# Patient Record
Sex: FEMALE | Race: WHITE
[De-identification: names, ages, dates, MRNs, and addresses within clinical notes are randomized per-mention and may not be internally consistent; named-entity substitution may affect disease eponyms.]

---

## 2017-05-13 ENCOUNTER — HOSPITAL ENCOUNTER (EMERGENCY)
Dept: HOSPITAL 63 - ER | Age: 22
Discharge: HOME | End: 2017-05-13
Payer: SELF-PAY

## 2017-05-13 VITALS — SYSTOLIC BLOOD PRESSURE: 100 MMHG | DIASTOLIC BLOOD PRESSURE: 68 MMHG

## 2017-05-13 VITALS — WEIGHT: 143 LBS | HEIGHT: 67 IN | BODY MASS INDEX: 22.44 KG/M2

## 2017-05-13 DIAGNOSIS — Z88.1: ICD-10-CM

## 2017-05-13 DIAGNOSIS — Z88.6: ICD-10-CM

## 2017-05-13 DIAGNOSIS — N39.0: ICD-10-CM

## 2017-05-13 DIAGNOSIS — O21.0: Primary | ICD-10-CM

## 2017-05-13 DIAGNOSIS — F15.10: ICD-10-CM

## 2017-05-13 DIAGNOSIS — Z3A.01: ICD-10-CM

## 2017-05-13 DIAGNOSIS — Z87.442: ICD-10-CM

## 2017-05-13 DIAGNOSIS — R51: ICD-10-CM

## 2017-05-13 DIAGNOSIS — F14.10: ICD-10-CM

## 2017-05-13 LAB
ALBUMIN SERPL-MCNC: 3.8 G/DL (ref 3.4–5)
ALP SERPL-CCNC: 48 U/L (ref 46–116)
ALT SERPL-CCNC: 21 U/L (ref 14–59)
AMPHETAMINE/METHAMPHETAMINE: (no result)
ANION GAP SERPL CALC-SCNC: 8 MMOL/L (ref 6–14)
APTT PPP: YELLOW S
AST SERPL-CCNC: 13 U/L (ref 15–37)
BACTERIA #/AREA URNS HPF: (no result) /HPF
BARBITURATES UR-MCNC: (no result) UG/ML
BASOPHILS # BLD AUTO: 0 X10^3/UL (ref 0–0.2)
BASOPHILS NFR BLD: 0 % (ref 0–3)
BENZODIAZ UR-MCNC: (no result) UG/L
BILIRUB DIRECT SERPL-MCNC: 0.1 MG/DL (ref 0–0.2)
BILIRUB SERPL-MCNC: 0.3 MG/DL (ref 0.2–1)
BILIRUB UR QL STRIP: (no result)
CA-I SERPL ISE-MCNC: 6 MG/DL (ref 7–20)
CALCIUM SERPL-MCNC: 8.8 MG/DL (ref 8.5–10.1)
CANNABINOIDS UR-MCNC: (no result) UG/L
CHLORIDE SERPL-SCNC: 105 MMOL/L (ref 98–107)
CO2 SERPL-SCNC: 27 MMOL/L (ref 21–32)
COCAINE UR-MCNC: (no result) NG/ML
CREAT SERPL-MCNC: 0.7 MG/DL (ref 0.6–1)
EOSINOPHIL NFR BLD: 0 X10^3/UL (ref 0–0.7)
EOSINOPHIL NFR BLD: 1 % (ref 0–3)
ERYTHROCYTE [DISTWIDTH] IN BLOOD BY AUTOMATED COUNT: 14.2 % (ref 11.5–14.5)
FIBRINOGEN PPP-MCNC: (no result) MG/DL
GFR SERPLBLD BASED ON 1.73 SQ M-ARVRAT: 105.6 ML/MIN
GLUCOSE SERPL-MCNC: 91 MG/DL (ref 70–99)
GLUCOSE UR STRIP-MCNC: (no result) MG/DL
HCT VFR BLD CALC: 39.6 % (ref 36–47)
HGB BLD-MCNC: 13.4 G/DL (ref 12–15.5)
LYMPHOCYTES # BLD: 2.2 X10^3/UL (ref 1–4.8)
LYMPHOCYTES NFR BLD AUTO: 23 % (ref 24–48)
MAGNESIUM SERPL-MCNC: 1.9 MG/DL (ref 1.8–2.4)
MCH RBC QN AUTO: 31 PG (ref 25–35)
MCHC RBC AUTO-ENTMCNC: 34 G/DL (ref 31–37)
MCV RBC AUTO: 93 FL (ref 79–100)
METHADONE SERPL-MCNC: (no result) NG/ML
MONO #: 0.5 X10^3/UL (ref 0–1.1)
MONOCYTES NFR BLD: 5 % (ref 0–9)
NEUT #: 6.7 X10^3UL (ref 1.8–7.7)
NEUTROPHILS NFR BLD AUTO: 71 % (ref 31–73)
NITRITE UR QL STRIP: (no result)
OPIATES UR-MCNC: (no result) NG/ML
PCP SERPL-MCNC: (no result) MG/DL
PLATELET # BLD AUTO: 202 X10^3/UL (ref 140–400)
POTASSIUM SERPL-SCNC: 4 MMOL/L (ref 3.5–5.1)
PROT SERPL-MCNC: 7.4 G/DL (ref 6.4–8.2)
RBC # BLD AUTO: 4.27 X10^6/UL (ref 3.5–5.4)
RBC #/AREA URNS HPF: (no result) /HPF (ref 0–2)
SODIUM SERPL-SCNC: 140 MMOL/L (ref 136–145)
SP GR UR STRIP: 1.01
SQUAMOUS #/AREA URNS LPF: (no result) /LPF
UROBILINOGEN UR-MCNC: 0.2 MG/DL
WBC # BLD AUTO: 9.5 X10^3/UL (ref 4–11)
WBC #/AREA URNS HPF: (no result) /HPF (ref 0–4)

## 2017-05-13 PROCEDURE — 80076 HEPATIC FUNCTION PANEL: CPT

## 2017-05-13 PROCEDURE — 80305 DRUG TEST PRSMV DIR OPT OBS: CPT

## 2017-05-13 PROCEDURE — 36415 COLL VENOUS BLD VENIPUNCTURE: CPT

## 2017-05-13 PROCEDURE — 83735 ASSAY OF MAGNESIUM: CPT

## 2017-05-13 PROCEDURE — 96374 THER/PROPH/DIAG INJ IV PUSH: CPT

## 2017-05-13 PROCEDURE — 81001 URINALYSIS AUTO W/SCOPE: CPT

## 2017-05-13 PROCEDURE — 80048 BASIC METABOLIC PNL TOTAL CA: CPT

## 2017-05-13 PROCEDURE — 96375 TX/PRO/DX INJ NEW DRUG ADDON: CPT

## 2017-05-13 PROCEDURE — 96361 HYDRATE IV INFUSION ADD-ON: CPT

## 2017-05-13 PROCEDURE — 85027 COMPLETE CBC AUTOMATED: CPT

## 2017-05-13 PROCEDURE — S0028 INJECTION, FAMOTIDINE, 20 MG: HCPCS

## 2017-05-13 PROCEDURE — 80320 DRUG SCREEN QUANTALCOHOLS: CPT

## 2017-05-13 PROCEDURE — 99284 EMERGENCY DEPT VISIT MOD MDM: CPT

## 2017-05-13 PROCEDURE — 84702 CHORIONIC GONADOTROPIN TEST: CPT

## 2017-05-13 NOTE — PHYS DOC
General


Chief Complaint:  VOMITING IN PREGNANCY


Stated Complaint:  NAUSEA,VOMITING,CRAMPING 7 1/2 WKS PREGNANT


Time Seen by MD:  07:22


Source:  patient


Exam Limitations:  no limitations


Problems:  





History of Present Illness


Initial Comments


Pt is 7.5 wks gestation  21/F to ED with her mom for n/v, cramping in 

pregnancy.


Pt states she was seen two weeks ago at [facility] for similar sx, on that day pt 

dx pregnant and US confirmed viable intrauterine pregnancy estimated at the 

time 5.5wks.  Pt has not seen OB states she is waiting for her insurance card.  

States since she was seen at [facility] two weeks ago she's had nausea with 

vomitting daily, past few days decrease urination/muscle cramps/dizzy when up 

and active.  No vaginal discharge/bleeding, no focal abdominal pain complaints.

  Pt states she is monogamous in relationship, she admits to h/o trichomonas 

infection.  Pt also tests regularly as she was in a prior relationship with HIV

+ partner, pt has tested negative to date.  Pt states she has 2-3 year history 

of cocaine and methamphetamine abuse "in the past" but states she is clean of 

illicit drugs.


Timing/Duration:  constant


Severity:  moderate


Modifying Factors:  worse with eating, improves with medication, worse with 

movement, improves with rest


Associated Symptoms:  headaches, malaise, nausea/vomiting, weakness, other


Allergies:  


Coded Allergies:  


     hydrocodone (Verified  Allergy, Unknown, 17)


     amoxicillin (Verified  Adverse Reaction, Intermediate, 14)


     cefaclor (Verified  Adverse Reaction, Intermediate, 14)


 per ED RN-patient states she tolerated rocephin IM shots in


 the past (recently)





Past Medical History


Medical History:  other (kidney stones, UTI, trichomonas, HIV exposure)


Surgical History:  noncontributory





Family History


Significant Family History:  no pertinent family hx





Social History


Drugs:  other (cocaine and methamphetamine history denies current)





Review of Systems


Constitutional:  denies chills, denies fever, malaise


Respiratory:  denies cough, denies shortness of breath


Cardiovascular:  denies chest pain, denies palpitations


Gastrointestinal:  see HPI


Genitourinary:  denies dysuria, denies frequency, denies hematuria


Musculoskeletal:  denies back pain, denies joint swelling, denies neck pain


Psychiatric/Neurological:  headache, denies numbness, denies paresthesia, 

denies weakness


Hematologic/Lymphatic:  denies blood clots, denies easy bleeding, denies easy 

bruising





Physical Exam


General Appearance:  WD/WN, mild distress


Eyes:  bilateral eye normal inspection, bilateral eye PERRL, bilateral eye EOMI


Ear, Nose, Throat:  hearing grossly normal, normal ENT inspection, normal 

pharynx


Neck:  non-tender, supple


Respiratory:  normal breath sounds, no respiratory distress


Cardiovascular:  normal peripheral pulses, regular rate, rhythm, no edema


Gastrointestinal:  soft (ND, BS normal, generalized TTP no r/g/mass), no 

organomegaly


Rectal:  deferred


Back:  no CVA tenderness, no vertebral tenderness


Extremities:  non-tender, normal inspection


Neurologic/Psychiatric:  CNs II-XII nml as tested, no motor/sensory deficits, 

alert, normal mood/affect, oriented x 3


Skin:  normal color, warm/dry





Orders, Labs, Meds


Reassuring ED workup.





Pt feeling much better after zofran/IV fluids.  She expressed agreement/

understanding with treatment plan.


Departure


Time of Disposition:  09:08


Disposition:  01 HOME, SELF-CARE


Diagnosis:  hyperemesis gravidarium


Condition:  IMPROVED


Patient Instructions:  Diet - Hyperemesis Gravidarum, Hyperemesis Gravidarum, 

Medicines During Pregnancy





Additional Instructions:  


Please review the patient education materials.


OTC ranitidine for reflux symptoms.


Aggressive hydration with gatorade, water.


Stop smoking, seek medical assistance if necessary.


Take prenatal vitamins daily.


Rx:  zofrand odt


Schedule your initial OB evaluation ASAP.


Follow up with a doctor next week.


Return to ED with new or changing symptoms.











KEESHA VALDES DO May 13, 2017 07:51

## 2017-05-20 ENCOUNTER — HOSPITAL ENCOUNTER (EMERGENCY)
Dept: HOSPITAL 63 - ER | Age: 22
Discharge: HOME | End: 2017-05-20
Payer: MEDICAID

## 2017-05-20 VITALS — HEIGHT: 67 IN | WEIGHT: 143 LBS | BODY MASS INDEX: 22.44 KG/M2

## 2017-05-20 VITALS — DIASTOLIC BLOOD PRESSURE: 50 MMHG | SYSTOLIC BLOOD PRESSURE: 125 MMHG

## 2017-05-20 DIAGNOSIS — F15.10: ICD-10-CM

## 2017-05-20 DIAGNOSIS — S20.222A: ICD-10-CM

## 2017-05-20 DIAGNOSIS — F12.10: ICD-10-CM

## 2017-05-20 DIAGNOSIS — S30.0XXA: ICD-10-CM
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
## 2017-05-20 DIAGNOSIS — F14.10: ICD-10-CM

## 2017-05-20 DIAGNOSIS — O9A.211: Primary | ICD-10-CM

## 2017-05-20 DIAGNOSIS — Z88.1: ICD-10-CM
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
## 2017-05-20 DIAGNOSIS — Z87.440: ICD-10-CM

## 2017-05-20 DIAGNOSIS — Z87.442: ICD-10-CM

## 2017-05-20 DIAGNOSIS — Z3A.09: ICD-10-CM

## 2017-05-20 DIAGNOSIS — Z88.6: ICD-10-CM

## 2017-05-20 DIAGNOSIS — S20.221A: ICD-10-CM
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
## 2017-05-20 PROCEDURE — 99282 EMERGENCY DEPT VISIT SF MDM: CPT

## 2017-05-20 NOTE — PHYS DOC
Past History


Past Medical History:  Kidney Stones, Ovarian Cyst, UTI, Other


Past Surgical History:  Other


Smoking:  Non-smoker


Alcohol Use:  Rarely


Drug Use:  Cocaine, Marijuana, Methamphetamine





Adult General


Chief Complaint


Chief Complaint:  MECHANICAL FALL





HPI


HPI





Patient is a 21-year-old female who fell down a flight of stairs on her back, 

she was carrying something and the stairs worse deep, she just missed a step. 

She denies being injured by anyone, denies being pushed. She states she fell 

down about 15-20 stairs. She is especially worried because she is 9 weeks 

pregnant. She has not had any prenatal care, she is "waiting for her insurance"

. She's had no vaginal bleeding.





Review of Systems


Review of Systems








: States that she is pregnant


Musculoskeletal: Complains of back pain, denies other musculoskeletal pain





Allergies


Allergies





Allergies








Coded Allergies Type Severity Reaction Last Updated Verified


 


  hydrocodone Allergy Unknown  1/13/17 Yes


 


  amoxicillin Adverse Reaction Intermediate  11/11/14 Yes


 


  cefaclor Adverse Reaction Intermediate  11/11/14 Yes











Physical Exam


Physical Exam





Constitutional: Well developed, well nourished, tearful, worried, no acute 

distress, alert, mentating normally.


HENT: Normocephalic, atraumatic, bilateral external ears normal, nose normal. []





Neck: Normal range of motion, no stridor. [] 


Cardiovascular:Heart rate regular rhythm, no murmur []


Lungs & Thorax:  Bilateral breath sounds clear to auscultation []


Abdomen: Bowel sounds normal, soft, nondistended, no tenderness, no masses, no 

pulsatile masses. Uterus not palpable.


Skin: Warm, dry, no erythema, no rash. [] 


Back: No acute injury noted, no ecchymosis, no swelling, no deformity. Mild 

generalized tenderness to palpation across the mid to lower thoracic and upper 

lumbar areas and no bony point tenderness, no crepitance.


Extremities: No tenderness, no cyanosis, no clubbing, ROM intact, no edema. [] 


Neurologic: Alert and oriented X 3, normal motor function, normal sensory 

function, no focal deficits noted. []





Current Patient Data


Vital Signs





 Vital Signs








  Date Time  Temp Pulse Resp B/P (MAP) Pulse Ox O2 Delivery O2 Flow Rate FiO2


 


5/20/17 14:24 98.3 105 16  100 Room Air  











EKG


EKG


[]





Radiology/Procedures


Radiology/Procedures


[]





Course & Med Decision Making


Course & Med Decision Making


Pertinent Labs and Imaging studies reviewed. (See chart for details)





21-year-old female who states she is 9 weeks pregnant fell down a flight of 

stairs with pain to her back. I discussed with patient and mother that because 

she is pregnant, I would not recommend x-rays of her back and she states she 

does not want x-rays and agrees with that. I explained to the patient and 

mother that at 9 weeks, there is no danger to the tiny fetus from a fall, 

discussed that as the baby grows this will become more of a concern but at this 

time it is not a concern. Discussed symptomatic treatment including ice and 

Tylenol, and I urged her to follow up with OB for routine prenatal care as soon 

as possible.





[]





Dragon Disclaimer


Dragon Disclaimer


This chart was dictated in whole or in part using Voice Recognition software in 

a busy, high-work load, and often noisy Emergency Department environment.  It 

may contain unintended and wholly unrecognized errors or omissions.





Departure


Departure:


Impression:  


 Primary Impression:  


 Contusion of back


Disposition:  01 HOME, SELF-CARE


Condition:  STABLE


Referrals:  


PCP,NO (PCP)


Patient Instructions:  Contusion, Easy-to-Read





Additional Instructions:  


As we discussed, ice to areas of pain. Tylenol is safe during pregnancy. Rest, 

you'll probably hurt worse before you feel better.





As we discussed, in the first trimester,(12 weeks), your baby is very tiny and 

is well protected inside of your uterus and the bones of your pelvis. Although 

we can't determine if the pregnancy itself is "normal", we can save the your 

baby was not injured by the fall today because it is so tiny and well protected.





I encourage you to get OB follow-up as soon as possible. Check with the [facility] on what they can provide you until you're able to get an OB 

doctor.











JOSEPHINE BARBER MD May 20, 2017 14:57

## 2017-08-11 ENCOUNTER — HOSPITAL ENCOUNTER (EMERGENCY)
Dept: HOSPITAL 63 - ER | Age: 22
Discharge: HOME | End: 2017-08-11
Payer: SELF-PAY

## 2017-08-11 VITALS — DIASTOLIC BLOOD PRESSURE: 49 MMHG | SYSTOLIC BLOOD PRESSURE: 100 MMHG

## 2017-08-11 DIAGNOSIS — Z88.5: ICD-10-CM

## 2017-08-11 DIAGNOSIS — Z88.8: ICD-10-CM

## 2017-08-11 DIAGNOSIS — Z87.442: ICD-10-CM

## 2017-08-11 DIAGNOSIS — Z88.1: ICD-10-CM

## 2017-08-11 DIAGNOSIS — O21.0: Primary | ICD-10-CM

## 2017-08-11 DIAGNOSIS — Z3A.21: ICD-10-CM

## 2017-08-11 LAB
APTT PPP: YELLOW S
BACTERIA #/AREA URNS HPF: (no result) /HPF
BILIRUB UR QL STRIP: (no result)
FIBRINOGEN PPP-MCNC: (no result) MG/DL
GLUCOSE UR STRIP-MCNC: (no result) MG/DL
NITRITE UR QL STRIP: (no result)
RBC #/AREA URNS HPF: (no result) /HPF (ref 0–2)
SP GR UR STRIP: 1.02
SQUAMOUS #/AREA URNS LPF: (no result) /LPF
UROBILINOGEN UR-MCNC: 0.2 MG/DL
WBC #/AREA URNS HPF: (no result) /HPF (ref 0–4)

## 2017-08-11 PROCEDURE — 96360 HYDRATION IV INFUSION INIT: CPT

## 2017-08-11 PROCEDURE — 99284 EMERGENCY DEPT VISIT MOD MDM: CPT

## 2017-08-11 PROCEDURE — 87086 URINE CULTURE/COLONY COUNT: CPT

## 2017-08-11 PROCEDURE — 81001 URINALYSIS AUTO W/SCOPE: CPT

## 2017-08-11 PROCEDURE — 96361 HYDRATE IV INFUSION ADD-ON: CPT

## 2017-08-11 NOTE — PHYS DOC
Past History


Past Medical History:  Kidney Stones, Ovarian Cyst, UTI, Other


Past Surgical History:  Other


Smoking:  Non-smoker


Alcohol Use:  Rarely


Drug Use:  Cocaine, Marijuana, Methamphetamine





Adult General


Chief Complaint


Chief Complaint:  VOMITING IN PREGNANCY





HPI


HPI





Patient is a 22-year-old female,  1, para 0, 21 weeks pregnant, 

complaining of vomiting. Patient's doctor told her to come in and get IV 

fluids. Patient had vomiting earlier in her pregnancy and did have to come in 

for some IV fluids, it seemed to improve but she has been having vomiting over 

the last 1-2 days. No blood in her stool. No diarrhea. No fever. No vaginal 

bleeding or discharge. She has felt the baby move. Her next doctor's 

appointment is in 2 weeks.





Patient denies chronic medical problems.





Review of Systems


Review of Systems





Constitutional: Denies fever or chills []


GI: As in history of present illness


: Denies dysuria or hematuria 


Musculoskeletal: Denies back pain or joint pain []





Allergies


Allergies





Allergies








Coded Allergies Type Severity Reaction Last Updated Verified


 


  hydrocodone Allergy Unknown  17 Yes


 


  amoxicillin Adverse Reaction Intermediate  14 Yes


 


  cefaclor Adverse Reaction Intermediate  14 Yes











Physical Exam


Physical Exam





Constitutional: Well developed, well nourished, no acute distress, non-toxic 

appearance. Alert, ambulatory, mentating normally.


HENT: Normocephalic, atraumatic, bilateral external ears normal, oropharynx 

moist,  nose normal. []


Eyes:  conjunctiva normal, no discharge. [] 


Neck: Normal range of motion,  no stridor. [] 


Cardiovascular:Heart rate regular rhythm, no murmur 


Lungs & Thorax:  Bilateral breath sounds clear to auscultation []


Abdomen: Gravid consistent with 21 weeks, soft, no tenderness, no masses, no 

pulsatile masses. [] 


Skin: Warm, dry, no erythema, no rash. [] 


Extremities: No tenderness, no cyanosis, no clubbing, ROM intact, no edema. [] 


Neurologic: Alert and oriented X 3, normal motor function, normal sensory 

function, no focal deficits noted. []





EKG


EKG


[]





Radiology/Procedures


Radiology/Procedures


[]





Course & Med Decision Making


Course & Med Decision Making


Pertinent Labs and Imaging studies reviewed. (See chart for details)





22-year-old female who is 21 weeks pregnant presents with vomiting. She appears 

nontoxic. She is ambulatory. She did not have any vomiting in the emergency 

department. She was given 2 L of IV fluids. Urinalysis consistent with 

dehydration and small amount of ketones but she believes she will be able to 

eat and drink if she gets a prescription for Zofran, which she has used in the 

past and works well for her.





[]





Dragon Disclaimer


Dragon Disclaimer


This chart was dictated in whole or in part using Voice Recognition software in 

a busy, high-work load, and often noisy Emergency Department environment.  It 

may contain unintended and wholly unrecognized errors or omissions.





Departure


Departure:


Impression:  


 Primary Impression:  


 Vomiting during pregnancy


Disposition:   HOME, SELF-CARE


Condition:  IMPROVED


Referrals:  


PCP,HARPER (PCP)


Patient Instructions:  Nausea and Vomiting, Easy-to-Read





Additional Instructions:  


Try to sip small amounts of fluids all day long during the day to stay hydrated.





I have prescribed Zofran for nausea if needed.





Try to stay well rested, which often helps with nausea as well.


Scripts


Ondansetron (ZOFRAN ODT) 4 Mg Tab.rapdis


1 TAB SL Q8HRS Y for NAUSEA, #10 TAB


   Prov: JOSEPHINE BARBER MD         17











JOSEPHINE BARBER MD Aug 11, 2017 13:20

## 2018-11-07 ENCOUNTER — HOSPITAL ENCOUNTER (EMERGENCY)
Dept: HOSPITAL 63 - ER | Age: 23
Discharge: HOME | End: 2018-11-07
Payer: COMMERCIAL

## 2018-11-07 VITALS — SYSTOLIC BLOOD PRESSURE: 117 MMHG | DIASTOLIC BLOOD PRESSURE: 69 MMHG

## 2018-11-07 VITALS — HEIGHT: 67 IN | BODY MASS INDEX: 20.21 KG/M2 | WEIGHT: 128.75 LBS

## 2018-11-07 DIAGNOSIS — Z88.5: ICD-10-CM

## 2018-11-07 DIAGNOSIS — F19.10: ICD-10-CM

## 2018-11-07 DIAGNOSIS — Z88.1: ICD-10-CM

## 2018-11-07 DIAGNOSIS — R10.84: Primary | ICD-10-CM

## 2018-11-07 LAB
ALBUMIN SERPL-MCNC: 4 G/DL (ref 3.4–5)
ALBUMIN/GLOB SERPL: 0.9 {RATIO} (ref 1–1.7)
ALP SERPL-CCNC: 88 U/L (ref 46–116)
ALT SERPL-CCNC: 29 U/L (ref 14–59)
AMPHETAMINE/METHAMPHETAMINE: (no result)
ANION GAP SERPL CALC-SCNC: 8 MMOL/L (ref 6–14)
APTT PPP: (no result) S
AST SERPL-CCNC: 21 U/L (ref 15–37)
BACTERIA #/AREA URNS HPF: 0 /HPF
BARBITURATES UR-MCNC: (no result) UG/ML
BASOPHILS # BLD AUTO: 0 X10^3/UL (ref 0–0.2)
BASOPHILS NFR BLD: 0 % (ref 0–3)
BENZODIAZ UR-MCNC: (no result) UG/L
BILIRUB SERPL-MCNC: 0.3 MG/DL (ref 0.2–1)
BILIRUB UR QL STRIP: (no result)
BUN/CREAT SERPL: 12 (ref 6–20)
CA-I SERPL ISE-MCNC: 12 MG/DL (ref 7–20)
CALCIUM SERPL-MCNC: 9 MG/DL (ref 8.5–10.1)
CANNABINOIDS UR-MCNC: (no result) UG/L
CHLORIDE SERPL-SCNC: 100 MMOL/L (ref 98–107)
CO2 SERPL-SCNC: 29 MMOL/L (ref 21–32)
COCAINE UR-MCNC: (no result) NG/ML
CREAT SERPL-MCNC: 1 MG/DL (ref 0.6–1)
EOSINOPHIL NFR BLD: 0.2 X10^3/UL (ref 0–0.7)
EOSINOPHIL NFR BLD: 2 % (ref 0–3)
ERYTHROCYTE [DISTWIDTH] IN BLOOD BY AUTOMATED COUNT: 14.7 % (ref 11.5–14.5)
FIBRINOGEN PPP-MCNC: CLEAR MG/DL
GFR SERPLBLD BASED ON 1.73 SQ M-ARVRAT: 68.7 ML/MIN
GLOBULIN SER-MCNC: 4.6 G/DL (ref 2.2–3.8)
GLUCOSE SERPL-MCNC: 118 MG/DL (ref 70–99)
GLUCOSE UR STRIP-MCNC: (no result) MG/DL
HCT VFR BLD CALC: 44.1 % (ref 36–47)
HGB BLD-MCNC: 14.8 G/DL (ref 12–15.5)
LIPASE: 140 U/L (ref 73–393)
LYMPHOCYTES # BLD: 4.5 X10^3/UL (ref 1–4.8)
LYMPHOCYTES NFR BLD AUTO: 35 % (ref 24–48)
MCH RBC QN AUTO: 30 PG (ref 25–35)
MCHC RBC AUTO-ENTMCNC: 34 G/DL (ref 31–37)
MCV RBC AUTO: 90 FL (ref 79–100)
METHADONE SERPL-MCNC: (no result) NG/ML
MONO #: 0.8 X10^3/UL (ref 0–1.1)
MONOCYTES NFR BLD: 6 % (ref 0–9)
NEUT #: 7.3 X10^3UL (ref 1.8–7.7)
NEUTROPHILS NFR BLD AUTO: 57 % (ref 31–73)
NITRITE UR QL STRIP: (no result)
OPIATES UR-MCNC: (no result) NG/ML
PCP SERPL-MCNC: (no result) MG/DL
PLATELET # BLD AUTO: 342 X10^3/UL (ref 140–400)
POTASSIUM SERPL-SCNC: 4.1 MMOL/L (ref 3.5–5.1)
PREG TEST PT QUAL: NEGATIVE
PROT SERPL-MCNC: 8.6 G/DL (ref 6.4–8.2)
RBC # BLD AUTO: 4.9 X10^6/UL (ref 3.5–5.4)
RBC #/AREA URNS HPF: (no result) /HPF (ref 0–2)
SODIUM SERPL-SCNC: 137 MMOL/L (ref 136–145)
SP GR UR STRIP: >=1.03
SQUAMOUS #/AREA URNS LPF: (no result) /LPF
UROBILINOGEN UR-MCNC: 0.2 MG/DL
WBC # BLD AUTO: 12.8 X10^3/UL (ref 4–11)
WBC #/AREA URNS HPF: (no result) /HPF (ref 0–4)

## 2018-11-07 PROCEDURE — 85025 COMPLETE CBC W/AUTO DIFF WBC: CPT

## 2018-11-07 PROCEDURE — 99285 EMERGENCY DEPT VISIT HI MDM: CPT

## 2018-11-07 PROCEDURE — 96372 THER/PROPH/DIAG INJ SC/IM: CPT

## 2018-11-07 PROCEDURE — 83690 ASSAY OF LIPASE: CPT

## 2018-11-07 PROCEDURE — 74177 CT ABD & PELVIS W/CONTRAST: CPT

## 2018-11-07 PROCEDURE — 96374 THER/PROPH/DIAG INJ IV PUSH: CPT

## 2018-11-07 PROCEDURE — 80307 DRUG TEST PRSMV CHEM ANLYZR: CPT

## 2018-11-07 PROCEDURE — 36415 COLL VENOUS BLD VENIPUNCTURE: CPT

## 2018-11-07 PROCEDURE — 96375 TX/PRO/DX INJ NEW DRUG ADDON: CPT

## 2018-11-07 PROCEDURE — 81025 URINE PREGNANCY TEST: CPT

## 2018-11-07 PROCEDURE — 84703 CHORIONIC GONADOTROPIN ASSAY: CPT

## 2018-11-07 PROCEDURE — 80053 COMPREHEN METABOLIC PANEL: CPT

## 2018-11-07 PROCEDURE — 81001 URINALYSIS AUTO W/SCOPE: CPT

## 2018-11-07 NOTE — PHYS DOC
Adult General


Chief Complaint


Chief Complaint


Abdominal pain





HPI


HPI





23 years old female with history of drug abuse percent to the emergency 

department with abdominal pain all over her abdomen no nausea no vomiting no 

diarrhea status is been constipated over she had a bowel movement today no 

fever no chills no urgency no frequency





Review of Systems


Review of Systems





Constitutional: Denies fever or chills []


Eyes: Denies change in visual acuity, redness, or eye pain []


HENT: Denies nasal congestion or sore throat []


Respiratory: Denies cough or shortness of breath []


Cardiovascular: No additional information not addressed in HPI []


GI: Denies, nausea, vomiting, bloody stools or diarrhea []


: Denies dysuria or hematuria []


Musculoskeletal: Denies back pain or joint pain []


Integument: Denies rash or skin lesions []


Neurologic: Denies headache, focal weakness or sensory changes []


Endocrine: Denies polyuria or polydipsia []





All other systems were reviewed and found to be within normal limits, except as 

documented in this note.





Current Medications


Current Medications





Current Medications








 Medications


  (Trade)  Dose


 Ordered  Sig/Kathleen  Start Time


 Stop Time Status Last Admin


Dose Admin


 


 Diphenhydramine


 HCl


  (Benadryl)  25 mg  1X  ONCE  11/7/18 19:45


 11/7/18 19:46 DC 11/7/18 19:56


25 MG


 


 Haloperidol


 Lactate


  (Haldol)  2 mg  1X  ONCE  11/7/18 19:45


 11/7/18 19:46 DC 11/7/18 19:55


2 MG


 


 Iohexol


  (Omnipaque 300


 Mg/ml)  75 ml  1X  ONCE  11/7/18 20:30


 11/7/18 20:31 DC 11/7/18 20:26


75 ML


 


 Ketorolac


 Tromethamine


  (Toradol 30mg


 Vial)  30 mg  1X  ONCE  11/7/18 20:00


 11/7/18 20:01 DC 11/7/18 19:57


30 MG


 


 Ondansetron HCl


  (Zofran)  4 mg  1X  ONCE  11/7/18 19:45


 11/7/18 19:46 DC 11/7/18 19:55


4 MG


 


 Sodium Chloride  1,000 ml @ 


 100 mls/hr  Q10H  11/7/18 19:18


 11/8/18 05:17  11/7/18 19:57


100 MLS/HR











Allergies


Allergies





Allergies








Coded Allergies Type Severity Reaction Last Updated Verified


 


  hydrocodone Allergy Unknown  11/7/18 Yes


 


  amoxicillin Adverse Reaction Intermediate  11/11/14 Yes


 


  cefaclor Adverse Reaction Intermediate  11/11/14 Yes











Physical Exam


Physical Exam





Constitutional: Well developed, well nourished, no acute distress, non-toxic 

appearance. []


HENT: Normocephalic, atraumatic, bilateral external ears normal, oropharynx 

moist, no oral exudates, nose normal. []


Eyes: PERRLA, EOMI, conjunctiva normal, no discharge. [] 


Neck: Normal range of motion, no tenderness, supple, no stridor. [] 


Cardiovascular:Heart rate regular rhythm, no murmur []


Lungs & Thorax:  Bilateral breath sounds clear to auscultation []


Abdomen: Bowel sounds normal, soft, mild tenderness all over tenderness, no 

masses, no pulsatile masses. [] 


Skin: Warm, dry, no erythema, no rash. [] 


Back: No tenderness, no CVA tenderness. [] 


Extremities: No tenderness, no cyanosis, no clubbing, ROM intact, no edema. [] 


Neurologic: Alert and oriented X 3, normal motor function, normal sensory 

function, no focal deficits noted. []


Psychologic: Affect normal, judgement normal, mood normal. []





Current Patient Data


Lab Results





 Laboratory Tests








Test


 11/7/18


19:10 11/7/18


19:40 11/7/18


19:46


 


White Blood Count


 12.8 x10^3/uL


(4.0-11.0)  H 


 





 


Red Blood Count


 4.90 x10^6/uL


(3.50-5.40) 


 





 


Hemoglobin


 14.8 g/dL


(12.0-15.5) 


 





 


Hematocrit


 44.1 %


(36.0-47.0) 


 





 


Mean Corpuscular Volume


 90 fL ()


 


 





 


Mean Corpuscular Hemoglobin 30 pg (25-35)    


 


Mean Corpuscular Hemoglobin


Concent 34 g/dL


(31-37) 


 





 


Red Cell Distribution Width


 14.7 %


(11.5-14.5)  H 


 





 


Platelet Count


 342 x10^3/uL


(140-400) 


 





 


Neutrophils (%) (Auto) 57 % (31-73)    


 


Lymphocytes (%) (Auto) 35 % (24-48)    


 


Monocytes (%) (Auto) 6 % (0-9)    


 


Eosinophils (%) (Auto) 2 % (0-3)    


 


Basophils (%) (Auto) 0 % (0-3)    


 


Neutrophils # (Auto)


 7.3 x10^3uL


(1.8-7.7) 


 





 


Lymphocytes # (Auto)


 4.5 x10^3/uL


(1.0-4.8) 


 





 


Monocytes # (Auto)


 0.8 x10^3/uL


(0.0-1.1) 


 





 


Eosinophils # (Auto)


 0.2 x10^3/uL


(0.0-0.7) 


 





 


Basophils # (Auto)


 0.0 x10^3/uL


(0.0-0.2) 


 





 


Sodium Level


 137 mmol/L


(136-145) 


 





 


Potassium Level


 4.1 mmol/L


(3.5-5.1) 


 





 


Chloride Level


 100 mmol/L


() 


 





 


Carbon Dioxide Level


 29 mmol/L


(21-32) 


 





 


Anion Gap 8 (6-14)    


 


Blood Urea Nitrogen


 12 mg/dL


(7-20) 


 





 


Creatinine


 1.0 mg/dL


(0.6-1.0) 


 





 


Estimated GFR


(Cockcroft-Gault) 68.7  


 


 





 


BUN/Creatinine Ratio 12 (6-20)    


 


Glucose Level


 118 mg/dL


(70-99)  H 


 





 


Calcium Level


 9.0 mg/dL


(8.5-10.1) 


 





 


Total Bilirubin


 0.3 mg/dL


(0.2-1.0) 


 





 


Aspartate Amino Transferase


(AST) 21 U/L (15-37)


 


 





 


Alanine Aminotransferase (ALT)


 29 U/L (14-59)


 


 





 


Alkaline Phosphatase


 88 U/L


() 


 





 


Total Protein


 8.6 g/dL


(6.4-8.2)  H 


 





 


Albumin


 4.0 g/dL


(3.4-5.0) 


 





 


Albumin/Globulin Ratio


 0.9 (1.0-1.7)


L 


 





 


Lipase


 140 U/L


() 


 





 


Urine Collection Type  Unknown   


 


Urine Color  Yanira   


 


Urine Clarity  Clear   


 


Urine pH  5.0   


 


Urine Specific Gravity  >=1.030   


 


Urine Protein


 


 Neg


(NEG-TRACE) 





 


Urine Glucose (UA)


 


 Neg mg/dL


(NEG) 





 


Urine Ketones (Stick)


 


 Neg mg/dL


(NEG) 





 


Urine Blood  Neg (NEG)   


 


Urine Nitrite  Neg (NEG)   


 


Urine Bilirubin  Neg (NEG)   


 


Urine Urobilinogen Dipstick


 


 0.2 mg/dL (0.2


mg/dL) 





 


Urine Leukocyte Esterase  Neg (NEG)   


 


Urine RBC


 


 1-2 /HPF (0-2)


 





 


Urine WBC


 


 1-4 /HPF (0-4)


 





 


Urine Squamous Epithelial


Cells 


 Many /LPF  


 





 


Urine Bacteria


 


 0 /HPF (0-FEW)


 





 


Urine Mucus  Mod /LPF   


 


Serum Pregnancy Test,


Qualitative 


 Negative (NEG)


 





 


Urine Opiates Screen  Neg (NEG)   


 


Urine Methadone Screen  Neg (NEG)   


 


Urine Barbiturates  Neg (NEG)   


 


Urine Phencyclidine Screen  Neg (NEG)   


 


Urine


Amphetamine/Methamphetamine 


 Pos (NEG)  


 





 


Urine Benzodiazepines Screen  Neg (NEG)   


 


Urine Cocaine Screen  Neg (NEG)   


 


Urine Cannabinoids Screen  Pos (NEG)   


 


Urine Ethyl Alcohol  Neg (NEG)   


 


POC Urine HCG, Qualitative


 


 


 hcg negative


(Negative)











EKG


EKG


[]





Radiology/Procedures


Radiology/Procedures


[]





Course & Med Decision Making


Course & Med Decision Making


Pertinent Labs and Imaging studies reviewed. (See chart for details)





[]





Final Impression


Final Impression


[]


Problems:  


(1) Abdominal pain


Qualifiers:  


   Qualified Codes:  R10.84 - Generalized abdominal pain


(2) Drug abuse





Dragon Disclaimer


Dragon Disclaimer


This electronic medical record was generated, in whole or in part, using a 

voice recognition dictation system.











MATTIE VERNON MD Nov 7, 2018 21:48

## 2019-01-04 ENCOUNTER — HOSPITAL ENCOUNTER (EMERGENCY)
Dept: HOSPITAL 63 - ER | Age: 24
Discharge: HOME | End: 2019-01-04
Payer: COMMERCIAL

## 2019-01-04 VITALS — WEIGHT: 135 LBS | BODY MASS INDEX: 21.19 KG/M2 | HEIGHT: 67 IN

## 2019-01-04 VITALS — DIASTOLIC BLOOD PRESSURE: 61 MMHG | SYSTOLIC BLOOD PRESSURE: 105 MMHG

## 2019-01-04 DIAGNOSIS — O99.511: ICD-10-CM

## 2019-01-04 DIAGNOSIS — O23.591: ICD-10-CM

## 2019-01-04 DIAGNOSIS — B96.89: ICD-10-CM

## 2019-01-04 DIAGNOSIS — Z88.5: ICD-10-CM

## 2019-01-04 DIAGNOSIS — Z88.1: ICD-10-CM

## 2019-01-04 DIAGNOSIS — Z3A.01: ICD-10-CM

## 2019-01-04 DIAGNOSIS — O20.0: Primary | ICD-10-CM

## 2019-01-04 DIAGNOSIS — J45.909: ICD-10-CM

## 2019-01-04 DIAGNOSIS — N76.0: ICD-10-CM

## 2019-01-04 LAB
ANION GAP SERPL CALC-SCNC: 7 MMOL/L (ref 6–14)
APTT PPP: YELLOW S
BACTERIA #/AREA URNS HPF: 0 /HPF
BASOPHILS # BLD AUTO: 0 X10^3/UL (ref 0–0.2)
BASOPHILS NFR BLD: 0 % (ref 0–3)
BILIRUB UR QL STRIP: (no result)
CA-I SERPL ISE-MCNC: 11 MG/DL (ref 7–20)
CALCIUM SERPL-MCNC: 8.5 MG/DL (ref 8.5–10.1)
CHLORIDE SERPL-SCNC: 105 MMOL/L (ref 98–107)
CO2 SERPL-SCNC: 31 MMOL/L (ref 21–32)
CREAT SERPL-MCNC: 0.9 MG/DL (ref 0.6–1)
EOSINOPHIL NFR BLD: 0.1 X10^3/UL (ref 0–0.7)
EOSINOPHIL NFR BLD: 1 % (ref 0–3)
ERYTHROCYTE [DISTWIDTH] IN BLOOD BY AUTOMATED COUNT: 14.6 % (ref 11.5–14.5)
FIBRINOGEN PPP-MCNC: CLEAR MG/DL
GFR SERPLBLD BASED ON 1.73 SQ M-ARVRAT: 77.6 ML/MIN
GLUCOSE SERPL-MCNC: 88 MG/DL (ref 70–99)
GLUCOSE UR STRIP-MCNC: (no result) MG/DL
HCT VFR BLD CALC: 37.5 % (ref 36–47)
HGB BLD-MCNC: 12.9 G/DL (ref 12–15.5)
LYMPHOCYTES # BLD: 2.4 X10^3/UL (ref 1–4.8)
LYMPHOCYTES NFR BLD AUTO: 31 % (ref 24–48)
MAGNESIUM SERPL-MCNC: 2 MG/DL (ref 1.8–2.4)
MCH RBC QN AUTO: 31 PG (ref 25–35)
MCHC RBC AUTO-ENTMCNC: 34 G/DL (ref 31–37)
MCV RBC AUTO: 91 FL (ref 79–100)
MONO #: 0.4 X10^3/UL (ref 0–1.1)
MONOCYTES NFR BLD: 5 % (ref 0–9)
NEUT #: 4.9 X10^3UL (ref 1.8–7.7)
NEUTROPHILS NFR BLD AUTO: 63 % (ref 31–73)
NITRITE UR QL STRIP: (no result)
PLATELET # BLD AUTO: 271 X10^3/UL (ref 140–400)
POTASSIUM SERPL-SCNC: 4.4 MMOL/L (ref 3.5–5.1)
RBC # BLD AUTO: 4.12 X10^6/UL (ref 3.5–5.4)
RBC #/AREA URNS HPF: (no result) /HPF (ref 0–2)
SODIUM SERPL-SCNC: 143 MMOL/L (ref 136–145)
SP GR UR STRIP: 1.02
SQUAMOUS #/AREA URNS LPF: (no result) /LPF
UROBILINOGEN UR-MCNC: 0.2 MG/DL
WBC # BLD AUTO: 7.7 X10^3/UL (ref 4–11)
WBC #/AREA URNS HPF: 0 /HPF (ref 0–4)

## 2019-01-04 PROCEDURE — 36415 COLL VENOUS BLD VENIPUNCTURE: CPT

## 2019-01-04 PROCEDURE — 96372 THER/PROPH/DIAG INJ SC/IM: CPT

## 2019-01-04 PROCEDURE — 84702 CHORIONIC GONADOTROPIN TEST: CPT

## 2019-01-04 PROCEDURE — 86901 BLOOD TYPING SEROLOGIC RH(D): CPT

## 2019-01-04 PROCEDURE — 86900 BLOOD TYPING SEROLOGIC ABO: CPT

## 2019-01-04 PROCEDURE — 87591 N.GONORRHOEAE DNA AMP PROB: CPT

## 2019-01-04 PROCEDURE — 81025 URINE PREGNANCY TEST: CPT

## 2019-01-04 PROCEDURE — 80048 BASIC METABOLIC PNL TOTAL CA: CPT

## 2019-01-04 PROCEDURE — 81001 URINALYSIS AUTO W/SCOPE: CPT

## 2019-01-04 PROCEDURE — 99284 EMERGENCY DEPT VISIT MOD MDM: CPT

## 2019-01-04 PROCEDURE — 76801 OB US < 14 WKS SINGLE FETUS: CPT

## 2019-01-04 PROCEDURE — 76817 TRANSVAGINAL US OBSTETRIC: CPT

## 2019-01-04 PROCEDURE — 83735 ASSAY OF MAGNESIUM: CPT

## 2019-01-04 PROCEDURE — 85025 COMPLETE CBC W/AUTO DIFF WBC: CPT

## 2019-01-04 PROCEDURE — 87491 CHLMYD TRACH DNA AMP PROBE: CPT

## 2019-01-04 NOTE — RAD
OB <14 WKS W/TV

 

History: Pelvic cramping and spotting, symptoms for about 7 weeks, 

positive pregnancy test

 

Comparison: None

 

FINDINGS: Multiple transabdominal sonographic images of the pelvis are 

submitted. Uterus measured 8.9 x 4 x 5.4 cm. Right ovary measured 2.6 x 

2.6 x 2.1 cm. Left ovary measured 2.4 x 1.7 x 2.4 cm. No intrauterine 

gestational sac is demonstrated. No abnormality is demonstrated of either 

ovary.

 

Transvaginal ultrasound: Multiple transvaginal sonographic images of 

pelvis are submitted. Endometrium is thin measuring about 0.7 cm, no 

intrauterine gestational sac demonstrated. Uterus measured 8.4 x 4.1 x 5.5

cm. Left ovary measured 2.7 x 2.4 x 2.9 cm with normal low resistance 

vascularity, a few follicles present. Right ovary measured 2.7 x 2 x 2.3 

cm with normal low resistance vascularity, a few follicles present. There 

is very small quantity of simple appearing free fluid in the pelvis.

 

Impression: 

 

1.  No intrauterine pregnancy is demonstrated, endometrium also not 

thickened. There is a small quantity of free fluid in the pelvis. No 

ectopic pregnancy is demonstrated of the adnexal regions. Correlation with

quantitative beta hCG value follow-up may be beneficial.

 

Electronically signed by: Salazar Salazar MD (1/4/2019 10:52 AM) 

[id number]

## 2019-04-09 ENCOUNTER — HOSPITAL ENCOUNTER (EMERGENCY)
Dept: HOSPITAL 63 - ER | Age: 24
LOS: 1 days | Discharge: HOME | End: 2019-04-10
Payer: COMMERCIAL

## 2019-04-09 VITALS — HEIGHT: 67 IN | BODY MASS INDEX: 24.01 KG/M2 | WEIGHT: 153 LBS

## 2019-04-09 DIAGNOSIS — O21.0: Primary | ICD-10-CM

## 2019-04-09 DIAGNOSIS — Z88.5: ICD-10-CM

## 2019-04-09 DIAGNOSIS — J02.9: ICD-10-CM

## 2019-04-09 DIAGNOSIS — Z3A.08: ICD-10-CM

## 2019-04-09 DIAGNOSIS — O99.511: ICD-10-CM

## 2019-04-09 DIAGNOSIS — J45.909: ICD-10-CM

## 2019-04-09 DIAGNOSIS — Z88.1: ICD-10-CM

## 2019-04-09 LAB
ALBUMIN SERPL-MCNC: 3.5 G/DL (ref 3.4–5)
ALBUMIN/GLOB SERPL: 1 {RATIO} (ref 1–1.7)
ALP SERPL-CCNC: 42 U/L (ref 46–116)
ALT SERPL-CCNC: 14 U/L (ref 14–59)
ANION GAP SERPL CALC-SCNC: 9 MMOL/L (ref 6–14)
APTT PPP: YELLOW S
AST SERPL-CCNC: 12 U/L (ref 15–37)
BACTERIA #/AREA URNS HPF: (no result) /HPF
BASOPHILS # BLD AUTO: 0 X10^3/UL (ref 0–0.2)
BASOPHILS NFR BLD: 0 % (ref 0–3)
BILIRUB SERPL-MCNC: 0.5 MG/DL (ref 0.2–1)
BILIRUB UR QL STRIP: (no result)
BUN/CREAT SERPL: 12 (ref 6–20)
CA-I SERPL ISE-MCNC: 7 MG/DL (ref 7–20)
CALCIUM SERPL-MCNC: 8.6 MG/DL (ref 8.5–10.1)
CHLORIDE SERPL-SCNC: 103 MMOL/L (ref 98–107)
CO2 SERPL-SCNC: 25 MMOL/L (ref 21–32)
CREAT SERPL-MCNC: 0.6 MG/DL (ref 0.6–1)
EOSINOPHIL NFR BLD: 0 % (ref 0–3)
EOSINOPHIL NFR BLD: 0 X10^3/UL (ref 0–0.7)
ERYTHROCYTE [DISTWIDTH] IN BLOOD BY AUTOMATED COUNT: 13.7 % (ref 11.5–14.5)
FIBRINOGEN PPP-MCNC: (no result) MG/DL
GFR SERPLBLD BASED ON 1.73 SQ M-ARVRAT: 123.9 ML/MIN
GLOBULIN SER-MCNC: 3.6 G/DL (ref 2.2–3.8)
GLUCOSE SERPL-MCNC: 84 MG/DL (ref 70–99)
GLUCOSE UR STRIP-MCNC: (no result) MG/DL
HCT VFR BLD CALC: 36 % (ref 36–47)
HGB BLD-MCNC: 12.2 G/DL (ref 12–15.5)
LIPASE: 60 U/L (ref 73–393)
LYMPHOCYTES # BLD: 1.4 X10^3/UL (ref 1–4.8)
LYMPHOCYTES NFR BLD AUTO: 18 % (ref 24–48)
MAGNESIUM SERPL-MCNC: 1.7 MG/DL (ref 1.8–2.4)
MCH RBC QN AUTO: 31 PG (ref 25–35)
MCHC RBC AUTO-ENTMCNC: 34 G/DL (ref 31–37)
MCV RBC AUTO: 92 FL (ref 79–100)
MONO #: 0.7 X10^3/UL (ref 0–1.1)
MONOCYTES NFR BLD: 8 % (ref 0–9)
NEUT #: 5.8 X10^3UL (ref 1.8–7.7)
NEUTROPHILS NFR BLD AUTO: 73 % (ref 31–73)
NITRITE UR QL STRIP: (no result)
PLATELET # BLD AUTO: 182 X10^3/UL (ref 140–400)
POTASSIUM SERPL-SCNC: 3.6 MMOL/L (ref 3.5–5.1)
PROT SERPL-MCNC: 7.1 G/DL (ref 6.4–8.2)
RBC # BLD AUTO: 3.93 X10^6/UL (ref 3.5–5.4)
RBC #/AREA URNS HPF: (no result) /HPF (ref 0–2)
SODIUM SERPL-SCNC: 137 MMOL/L (ref 136–145)
SP GR UR STRIP: 1.01
SQUAMOUS #/AREA URNS LPF: (no result) /LPF
UROBILINOGEN UR-MCNC: 0.2 MG/DL
WBC # BLD AUTO: 7.9 X10^3/UL (ref 4–11)
WBC #/AREA URNS HPF: (no result) /HPF (ref 0–4)

## 2019-04-09 PROCEDURE — 83690 ASSAY OF LIPASE: CPT

## 2019-04-09 PROCEDURE — 83735 ASSAY OF MAGNESIUM: CPT

## 2019-04-09 PROCEDURE — 87086 URINE CULTURE/COLONY COUNT: CPT

## 2019-04-09 PROCEDURE — 96361 HYDRATE IV INFUSION ADD-ON: CPT

## 2019-04-09 PROCEDURE — 81001 URINALYSIS AUTO W/SCOPE: CPT

## 2019-04-09 PROCEDURE — 80053 COMPREHEN METABOLIC PANEL: CPT

## 2019-04-09 PROCEDURE — 84702 CHORIONIC GONADOTROPIN TEST: CPT

## 2019-04-09 PROCEDURE — 99283 EMERGENCY DEPT VISIT LOW MDM: CPT

## 2019-04-09 PROCEDURE — 96365 THER/PROPH/DIAG IV INF INIT: CPT

## 2019-04-09 PROCEDURE — 36415 COLL VENOUS BLD VENIPUNCTURE: CPT

## 2019-04-09 PROCEDURE — 85025 COMPLETE CBC W/AUTO DIFF WBC: CPT

## 2019-04-09 PROCEDURE — 96366 THER/PROPH/DIAG IV INF ADDON: CPT

## 2019-04-09 PROCEDURE — 96375 TX/PRO/DX INJ NEW DRUG ADDON: CPT

## 2019-04-10 VITALS — SYSTOLIC BLOOD PRESSURE: 93 MMHG | DIASTOLIC BLOOD PRESSURE: 61 MMHG

## 2019-04-10 NOTE — PHYS DOC
Past History


Past Medical History:  Asthma


Past Surgical History:  Other


Smoking:  Non-smoker


Alcohol Use:  None


Drug Use:  Cocaine, Marijuana, Methadone, Other





Adult General


Chief Complaint


Chief Complaint:  ABDOMINAL PAIN IN PREGNANCY





HPI


HPI


23-year-old female presents as  at approximately 8 weeks gestation with 

report of progressive nausea and vomiting today. Patient reports she has had 

several week history of intermittent nausea and vomiting primarily in the 

morning. Denies diarrhea. Denies fever or chills. Patient does report having a 

sore throat this morning. Denies dysuria. Denies vaginal bleeding or discharge.





Review of Systems


Review of Systems





Constitutional: Denies fever or chills []


Eyes: Denies change in visual acuity, redness, or eye pain []


HENT: Denies nasal congestion; reports sore throat[]


Respiratory: Denies cough or shortness of breath []


Cardiovascular: Denies chest pain or palpitations


GI: Denies diarrhea; reports nausea and vomiting


: Denies dysuria or hematuria; reports pregnancy []


Musculoskeletal: Denies back pain or joint pain []


Integument: Denies rash or skin lesions []


Neurologic: Denies headache or sensory changes []





Complete systems were reviewed and found to be within normal limits, except as 

documented in this note.





Current Medications


Current Medications





Current Medications








 Medications


  (Trade)  Dose


 Ordered  Sig/[facility]  Start Time


 Stop Time Status Last Admin


Dose Admin


 


 Dextrose/Sodium


 Chloride  1,000 ml @ 


 1,000 mls/hr  1X  ONCE  19 22:30


 19 23:29 DC 19 23:01


1,000 MLS/HR


 


 Famotidine


  (Pepcid Vial)  20 mg  1X  ONCE  19 21:30


 19 21:31 DC 19 21:25


20 MG


 


 Magnesium Sulfate  50 ml @ 25


 mls/hr  1X  ONCE  19 22:30


 4/10/19 00:29  19 22:45


25 MLS/HR


 


 Ondansetron HCl


  (Zofran)  4 mg  1X  ONCE  19 21:30


 19 21:31 DC 19 21:25


4 MG


 


 Sodium Chloride  1,000 ml @ 


 1,000 mls/hr  1X  ONCE  19 21:15


 19 22:14 DC 19 21:25


1,000 MLS/HR











Allergies


Allergies





Allergies








Coded Allergies Type Severity Reaction Last Updated Verified


 


  hydrocodone Allergy Unknown  18 Yes


 


  amoxicillin Adverse Reaction Intermediate  14 Yes


 


  cefaclor Adverse Reaction Intermediate  14 Yes











Physical Exam


Physical Exam





Constitutional: Well developed, well nourished, no acute distress, non-toxic 

appearance. []


HENT: Normocephalic, atraumatic, oropharynx tacky. No pharyngeal erythema or 

exudate


Eyes: Conjunctiva normal, no discharge. [] 


Neck: Normal range of motion, no tenderness, supple, no meningeal signs


Cardiovascular: Heart rate regular rhythm, no murmur []


Lungs & Thorax:  Bilateral breath sounds clear to auscultation []


Abdomen: Soft, no tenderness


Skin: Warm, dry, no erythema, no rash. [] 


Back: No tenderness, no CVA tenderness. [] 


Extremities: No tenderness, ROM intact, no edema. [] 


Neurologic: Alert and oriented X 3, no focal deficits noted. []


Psychologic: Affect normal, judgement normal, mood normal. []





Current Patient Data


Vital Signs





 Vital Signs








  Date Time  Temp Pulse Resp B/P (MAP) Pulse Ox O2 Delivery O2 Flow Rate FiO2


 


19 22:47  87 18 94/55 (68) 100 Room Air  


 


19 21:09 98.6       








Lab Results





 Laboratory Tests








Test


 19


21:18 19


21:27


 


White Blood Count


 7.9 x10^3/uL


(4.0-11.0) 





 


Red Blood Count


 3.93 x10^6/uL


(3.50-5.40) 





 


Hemoglobin


 12.2 g/dL


(12.0-15.5) 





 


Hematocrit


 36.0 %


(36.0-47.0) 





 


Mean Corpuscular Volume


 92 fL ()


 





 


Mean Corpuscular Hemoglobin 31 pg (25-35)   


 


Mean Corpuscular Hemoglobin


Concent 34 g/dL


(31-37) 





 


Red Cell Distribution Width


 13.7 %


(11.5-14.5) 





 


Platelet Count


 182 x10^3/uL


(140-400) 





 


Neutrophils (%) (Auto) 73 % (31-73)   


 


Lymphocytes (%) (Auto) 18 % (24-48)  L 


 


Monocytes (%) (Auto) 8 % (0-9)   


 


Eosinophils (%) (Auto) 0 % (0-3)   


 


Basophils (%) (Auto) 0 % (0-3)   


 


Neutrophils # (Auto)


 5.8 x10^3uL


(1.8-7.7) 





 


Lymphocytes # (Auto)


 1.4 x10^3/uL


(1.0-4.8) 





 


Monocytes # (Auto)


 0.7 x10^3/uL


(0.0-1.1) 





 


Eosinophils # (Auto)


 0.0 x10^3/uL


(0.0-0.7) 





 


Basophils # (Auto)


 0.0 x10^3/uL


(0.0-0.2) 





 


Maternal Serum HCG Beta


Subunit [id number] mIU/mL


(0-6)  H 





 


Sodium Level


 137 mmol/L


(136-145) 





 


Potassium Level


 3.6 mmol/L


(3.5-5.1) 





 


Chloride Level


 103 mmol/L


() 





 


Carbon Dioxide Level


 25 mmol/L


(21-32) 





 


Anion Gap 9 (6-14)   


 


Blood Urea Nitrogen


 7 mg/dL (7-20)


 





 


Creatinine


 0.6 mg/dL


(0.6-1.0) 





 


Estimated GFR


(Cockcroft-Gault) 123.9  


 





 


BUN/Creatinine Ratio 12 (6-20)   


 


Glucose Level


 84 mg/dL


(70-99) 





 


Calcium Level


 8.6 mg/dL


(8.5-10.1) 





 


Magnesium Level


 1.7 mg/dL


(1.8-2.4)  L 





 


Total Bilirubin


 0.5 mg/dL


(0.2-1.0) 





 


Aspartate Amino Transferase


(AST) 12 U/L (15-37)


L 





 


Alanine Aminotransferase (ALT)


 14 U/L (14-59)


 





 


Alkaline Phosphatase


 42 U/L


()  L 





 


Total Protein


 7.1 g/dL


(6.4-8.2) 





 


Albumin


 3.5 g/dL


(3.4-5.0) 





 


Albumin/Globulin Ratio 1.0 (1.0-1.7)   


 


Lipase


 60 U/L


()  L 





 


Urine Collection Type  Unknown  


 


Urine Color  Yellow  


 


Urine Clarity  Hazy  


 


Urine pH  6.5  


 


Urine Specific Gravity  1.010  


 


Urine Protein


 


 Neg


(NEG-TRACE)


 


Urine Glucose (UA)


 


 Neg mg/dL


(NEG)


 


Urine Ketones (Stick)


 


 >=160 mg/dL


(NEG)


 


Urine Blood  Trace (NEG)  


 


Urine Nitrite  Neg (NEG)  


 


Urine Bilirubin  Neg (NEG)  


 


Urine Urobilinogen Dipstick


 


 0.2 mg/dL (0.2


mg/dL)


 


Urine Leukocyte Esterase  Trace (NEG)  


 


Urine RBC


 


 Rare /HPF


(0-2)


 


Urine WBC


 


 5-10 /HPF


(0-4)


 


Urine Squamous Epithelial


Cells 


 Mod /LPF  





 


Urine Bacteria


 


 Few /HPF


(0-FEW)











EKG


EKG


[]





Radiology/Procedures


Radiology/Procedures


[]





Course & Med Decision Making


Course & Med Decision Making


Pertinent Lab studies reviewed. (See chart for details)





Patient presents with history of present illness and physical exam concerning 

for hyperemesis gravidarum. Abdomen non-peritoneal. Denies vaginal bleeding or 

discharge. Labs obtained and posted to chart. UA with significant ketones. UA 

does have some findings of possible infection versus contamination. Patient 

denies any symptoms therefore empiric antibiotics held. Symptomatic treatment 

provided with interval improvement of symptoms. Patient was given 1 L of normal 

saline along with 1 L of D5 normal saline. Magnesium replaced. Patient stable 

for discharge with outpatient follow-up with PCP/OB. Discussed findings and 

plan with patient and family, who acknowledge understanding and agreement.





Dragon Disclaimer


Dragon Disclaimer


This electronic medical record was generated, in whole or in part, using a 

voice recognition dictation system.





Departure


Departure:


Impression:  


 Primary Impression:  


 Hyperemesis gravidarum


Disposition:  01 HOME, SELF-CARE


Condition:  IMPROVED


Referrals:  


ANDREY ABERNATHY MD (PCP)


Patient Instructions:  Diet - Hyperemesis Gravidarum, Hyperemesis Gravidarum











DARBY MAGANA DO Apr 10, 2019 00:01

## 2019-06-14 ENCOUNTER — HOSPITAL ENCOUNTER (EMERGENCY)
Dept: HOSPITAL 63 - ER | Age: 24
Discharge: HOME | End: 2019-06-14
Payer: COMMERCIAL

## 2019-06-14 VITALS — SYSTOLIC BLOOD PRESSURE: 100 MMHG | DIASTOLIC BLOOD PRESSURE: 54 MMHG

## 2019-06-14 VITALS — BODY MASS INDEX: 26.3 KG/M2 | WEIGHT: 167.55 LBS | HEIGHT: 67 IN

## 2019-06-14 DIAGNOSIS — O99.512: ICD-10-CM

## 2019-06-14 DIAGNOSIS — O99.332: ICD-10-CM

## 2019-06-14 DIAGNOSIS — Z88.5: ICD-10-CM

## 2019-06-14 DIAGNOSIS — J45.909: ICD-10-CM

## 2019-06-14 DIAGNOSIS — Z3A.17: ICD-10-CM

## 2019-06-14 DIAGNOSIS — Z88.1: ICD-10-CM

## 2019-06-14 DIAGNOSIS — O99.282: ICD-10-CM

## 2019-06-14 DIAGNOSIS — R19.7: ICD-10-CM

## 2019-06-14 DIAGNOSIS — O21.9: Primary | ICD-10-CM

## 2019-06-14 DIAGNOSIS — E03.9: ICD-10-CM

## 2019-06-14 LAB
ALBUMIN SERPL-MCNC: 2.9 G/DL (ref 3.4–5)
ALBUMIN/GLOB SERPL: 0.8 {RATIO} (ref 1–1.7)
ALP SERPL-CCNC: 37 U/L (ref 46–116)
ALT SERPL-CCNC: 21 U/L (ref 14–59)
ANION GAP SERPL CALC-SCNC: 10 MMOL/L (ref 6–14)
APTT PPP: YELLOW S
AST SERPL-CCNC: 15 U/L (ref 15–37)
BACTERIA #/AREA URNS HPF: (no result) /HPF
BASOPHILS # BLD AUTO: 0 X10^3/UL (ref 0–0.2)
BASOPHILS NFR BLD: 0 % (ref 0–3)
BILIRUB SERPL-MCNC: 0.4 MG/DL (ref 0.2–1)
BILIRUB UR QL STRIP: (no result)
BUN/CREAT SERPL: 24 (ref 6–20)
CA-I SERPL ISE-MCNC: 12 MG/DL (ref 7–20)
CALCIUM SERPL-MCNC: 7.8 MG/DL (ref 8.5–10.1)
CHLORIDE SERPL-SCNC: 105 MMOL/L (ref 98–107)
CO2 SERPL-SCNC: 24 MMOL/L (ref 21–32)
CREAT SERPL-MCNC: 0.5 MG/DL (ref 0.6–1)
EOSINOPHIL NFR BLD: 0 % (ref 0–3)
EOSINOPHIL NFR BLD: 0 X10^3/UL (ref 0–0.7)
ERYTHROCYTE [DISTWIDTH] IN BLOOD BY AUTOMATED COUNT: 14.4 % (ref 11.5–14.5)
FIBRINOGEN PPP-MCNC: (no result) MG/DL
GFR SERPLBLD BASED ON 1.73 SQ M-ARVRAT: 152.9 ML/MIN
GLOBULIN SER-MCNC: 3.7 G/DL (ref 2.2–3.8)
GLUCOSE SERPL-MCNC: 103 MG/DL (ref 70–99)
GLUCOSE UR STRIP-MCNC: (no result) MG/DL
HCT VFR BLD CALC: 36.3 % (ref 36–47)
HGB BLD-MCNC: 12.3 G/DL (ref 12–15.5)
LIPASE: 78 U/L (ref 73–393)
LYMPHOCYTES # BLD: 0.6 X10^3/UL (ref 1–4.8)
LYMPHOCYTES NFR BLD AUTO: 5 % (ref 24–48)
MCH RBC QN AUTO: 31 PG (ref 25–35)
MCHC RBC AUTO-ENTMCNC: 34 G/DL (ref 31–37)
MCV RBC AUTO: 93 FL (ref 79–100)
MONO #: 0.2 X10^3/UL (ref 0–1.1)
MONOCYTES NFR BLD: 2 % (ref 0–9)
NEUT #: 10.3 X10^3UL (ref 1.8–7.7)
NEUTROPHILS NFR BLD AUTO: 93 % (ref 31–73)
NITRITE UR QL STRIP: (no result)
PLATELET # BLD AUTO: 184 X10^3/UL (ref 140–400)
POTASSIUM SERPL-SCNC: 3.6 MMOL/L (ref 3.5–5.1)
PROT SERPL-MCNC: 6.6 G/DL (ref 6.4–8.2)
RBC # BLD AUTO: 3.93 X10^6/UL (ref 3.5–5.4)
RBC #/AREA URNS HPF: 0 /HPF (ref 0–2)
SODIUM SERPL-SCNC: 139 MMOL/L (ref 136–145)
SP GR UR STRIP: >=1.03
SQUAMOUS #/AREA URNS LPF: (no result) /LPF
UROBILINOGEN UR-MCNC: 0.2 MG/DL
WBC # BLD AUTO: 11.1 X10^3/UL (ref 4–11)
WBC #/AREA URNS HPF: (no result) /HPF (ref 0–4)

## 2019-06-14 PROCEDURE — 96374 THER/PROPH/DIAG INJ IV PUSH: CPT

## 2019-06-14 PROCEDURE — 80053 COMPREHEN METABOLIC PANEL: CPT

## 2019-06-14 PROCEDURE — 36415 COLL VENOUS BLD VENIPUNCTURE: CPT

## 2019-06-14 PROCEDURE — 99284 EMERGENCY DEPT VISIT MOD MDM: CPT

## 2019-06-14 PROCEDURE — 85025 COMPLETE CBC W/AUTO DIFF WBC: CPT

## 2019-06-14 PROCEDURE — 83690 ASSAY OF LIPASE: CPT

## 2019-06-14 PROCEDURE — 81001 URINALYSIS AUTO W/SCOPE: CPT

## 2019-06-14 PROCEDURE — 96361 HYDRATE IV INFUSION ADD-ON: CPT

## 2019-06-14 NOTE — PHYS DOC
Past History


Past Medical History:  Asthma, Hypothyroid


Past Surgical History:  Other


Smoking:  Cigarettes, Less than 1pk/day


Additional Smoking Information:  


1/2 PPD


Alcohol Use:  None


Drug Use:  None





Adult General


Chief Complaint


Chief Complaint:  VOMITING IN PREGNANCY





HPI


HPI





Patient is a 23-year-old female presents complaining of nausea and vomiting that

started at 11:00 last night. Also some diarrhea. No blood in the stool or 

emesis. Patient is 17 weeks pregnant. Denies any dysuria. No fever. No new foods

or restaurants. No sick contacts. Nothing makes the symptoms better or worse. 

There is some mild to moderate cramping in her abdomen. Patient is G3, P1-0-1-1.

[]





Review of Systems


Review of Systems





Constitutional: Denies fever or chills []


Eyes: Denies change in visual acuity, redness, or eye pain []


HENT: Denies nasal congestion or sore throat []


Respiratory: Denies cough or shortness of breath []


Cardiovascular: No chest pain or palpitations[]


GI: See history of present illness[]


: Denies dysuria or hematuria []


Musculoskeletal: Denies back pain or joint pain []


Integument: Denies rash or skin lesions []


Neurologic: Denies headache, focal weakness or sensory changes []


Endocrine: Denies polyuria or polydipsia []





All other systems were reviewed and found to be within normal limits, except as 

documented in this note.





Allergies


Allergies





Allergies








Coded Allergies Type Severity Reaction Last Updated Verified


 


  hydrocodone Allergy Unknown  11/7/18 Yes


 


  amoxicillin Adverse Reaction Intermediate  6/14/19 Yes


 


  cefaclor Adverse Reaction Intermediate  6/14/19 Yes











Physical Exam


Physical Exam





Constitutional: Well developed, well nourished, no acute distress, non-toxic 

appearance. []


HENT: Normocephalic, atraumatic, bilateral external ears normal, dry mucous 

membranes, no oral exudates, nose normal. []


Eyes: PERRLA, EOMI, conjunctiva normal, no discharge. [] 


Neck: Normal range of motion, no tenderness, supple, no stridor. [] 


Cardiovascular:Heart rate is tachycardic with a regular rhythm, no murmur []


Lungs & Thorax:  Bilateral breath sounds clear to auscultation []


Abdomen: Bowel sounds normal, soft, diffuse tenderness, fundus approximately one

 hand breath below the umbilicus with fetal heart tones in the 150s, no rebound,

 no guarding, no rigidity, able to sit up and lay back without any difficulty. 

No pulsatile masses. [] 


Skin: Warm, dry, no erythema, no rash. [] 


Back: No tenderness, no CVA tenderness. [] 


Extremities: No tenderness, no cyanosis, no clubbing, ROM intact, no edema. [] 


Neurologic: Alert and oriented X 3, normal motor function, normal sensory 

function, no focal deficits noted. []


Psychologic: Affect normal, judgement normal, mood normal. []





Current Patient Data


Vital Signs





                                   Vital Signs








  Date Time  Temp Pulse Resp B/P (MAP) Pulse Ox O2 Delivery O2 Flow Rate FiO2


 


6/14/19 07:36 97.4 113 21  99 Room Air  











EKG


EKG


[]





Radiology/Procedures


Radiology/Procedures


[]





Course & Med Decision Making


Course & Med Decision Making


Pertinent Labs and Imaging studies reviewed. (See chart for details)





ED course: Patient arrived, was placed in bed, and tolerated exam well. She was 

given IV fluids as well as antiemetics and antispasmodics which improved her 

symptoms. After the return of laboratory findings, these were discussed with the

 patient who voiced understanding. All questions were answered. She was discharg

ed in improved condition.





Medical decision making: There is no evidence of ketosis, no significant 

electrolyte abnormalities. Patient is by mouth tolerant. Her urine while has 14 

white cells per Amisha has many squamous cells so believe this to be a co

ntaminated specimen. Culture will be an process.[]





Dragon Disclaimer


Dragon Disclaimer


This electronic medical record was generated, in whole or in part, using a voice

 recognition dictation system.





Departure


Departure:


Impression:  


   Primary Impression:  


   Nausea and vomiting


   Additional Impression:  


   Pregnancy


Disposition:  01 HOME, SELF-CARE


Condition:  IMPROVED


Referrals:  


ANDREY ABERNATHY MD (PCP)


Follow-up in 2 days


Patient Instructions:  ABCs of Pregnancy, Nausea and Vomiting





Additional Instructions:  


Drink plenty of fluids, frequent small sips. No fatty foods, no milk, and no 

pepper for the next 48 hours. For the next 48 hours eat a diet rich in carbo

hydrates with foods such as bananas, rice, applesauce, and toast. Follow-up with

 your regular doctor in 2 days. Return to the ER if unable to tolerate liquids, 

blood in your stool or emesis, or any other concerns.


Scripts


Dicyclomine Hcl (DICYCLOMINE HCL) 20 Mg Tablet


1 TAB PO TID for ABDOMINAL CRAMPING, #30 TAB 0 Refills


   Prov: OMAIRA FALCON DO         6/14/19 


Metoclopramide Hcl (REGLAN) 10 Mg Tablet


10 MG PO QID for nausea and vomiting, #30 TAB


   Prov: OMAIRA FALCON DO         6/14/19





Problem Qualifiers








   Primary Impression:  


   Nausea and vomiting


   Vomiting type:  unspecified  Vomiting Intractability:  non-intractable  Q

   ualified Codes:  R11.2 - Nausea with vomiting, unspecified


   Additional Impression:  


   Pregnancy


   Weeks of gestation:  17 weeks  Qualified Codes:  Z3A.17 - 17 weeks gestation 

   of pregnancy








OMAIRA FALCON DO          Jun 14, 2019 07:56

## 2020-07-23 ENCOUNTER — HOSPITAL ENCOUNTER (EMERGENCY)
Dept: HOSPITAL 63 - ER | Age: 25
Discharge: HOME | End: 2020-07-23
Payer: COMMERCIAL

## 2020-07-23 VITALS — SYSTOLIC BLOOD PRESSURE: 98 MMHG | DIASTOLIC BLOOD PRESSURE: 56 MMHG

## 2020-07-23 VITALS — WEIGHT: 167.55 LBS | HEIGHT: 67 IN | BODY MASS INDEX: 26.3 KG/M2

## 2020-07-23 DIAGNOSIS — Z88.5: ICD-10-CM

## 2020-07-23 DIAGNOSIS — F17.210: ICD-10-CM

## 2020-07-23 DIAGNOSIS — E03.9: ICD-10-CM

## 2020-07-23 DIAGNOSIS — R05: Primary | ICD-10-CM

## 2020-07-23 DIAGNOSIS — J02.9: ICD-10-CM

## 2020-07-23 DIAGNOSIS — J45.909: ICD-10-CM

## 2020-07-23 DIAGNOSIS — Z88.1: ICD-10-CM

## 2020-07-23 DIAGNOSIS — M79.10: ICD-10-CM

## 2020-07-23 DIAGNOSIS — Z20.828: ICD-10-CM

## 2020-07-23 LAB
ALBUMIN SERPL-MCNC: 3.6 G/DL (ref 3.4–5)
ALBUMIN/GLOB SERPL: 0.9 {RATIO} (ref 1–1.7)
ALP SERPL-CCNC: 70 U/L (ref 46–116)
ALT SERPL-CCNC: 30 U/L (ref 14–59)
ANION GAP SERPL CALC-SCNC: 7 MMOL/L (ref 6–14)
AST SERPL-CCNC: 21 U/L (ref 15–37)
BASOPHILS # BLD AUTO: 0 X10^3/UL (ref 0–0.2)
BASOPHILS NFR BLD: 0 % (ref 0–3)
BILIRUB SERPL-MCNC: 0.3 MG/DL (ref 0.2–1)
BUN/CREAT SERPL: 11 (ref 6–20)
CA-I SERPL ISE-MCNC: 12 MG/DL (ref 7–20)
CALCIUM SERPL-MCNC: 8.6 MG/DL (ref 8.5–10.1)
CHLORIDE SERPL-SCNC: 106 MMOL/L (ref 98–107)
CO2 SERPL-SCNC: 28 MMOL/L (ref 21–32)
CREAT SERPL-MCNC: 1.1 MG/DL (ref 0.6–1)
EOSINOPHIL NFR BLD: 0.2 X10^3/UL (ref 0–0.7)
EOSINOPHIL NFR BLD: 3 % (ref 0–3)
ERYTHROCYTE [DISTWIDTH] IN BLOOD BY AUTOMATED COUNT: 14 % (ref 11.5–14.5)
GFR SERPLBLD BASED ON 1.73 SQ M-ARVRAT: 61 ML/MIN
GLOBULIN SER-MCNC: 4 G/DL (ref 2.2–3.8)
GLUCOSE SERPL-MCNC: 99 MG/DL (ref 70–99)
HCT VFR BLD CALC: 37.1 % (ref 36–47)
HGB BLD-MCNC: 12.6 G/DL (ref 12–15.5)
LYMPHOCYTES # BLD: 2.8 X10^3/UL (ref 1–4.8)
LYMPHOCYTES NFR BLD AUTO: 40 % (ref 24–48)
MCH RBC QN AUTO: 31 PG (ref 25–35)
MCHC RBC AUTO-ENTMCNC: 34 G/DL (ref 31–37)
MCV RBC AUTO: 92 FL (ref 79–100)
MONO #: 0.5 X10^3/UL (ref 0–1.1)
MONOCYTES NFR BLD: 7 % (ref 0–9)
NEUT #: 3.4 X10^3UL (ref 1.8–7.7)
NEUTROPHILS NFR BLD AUTO: 50 % (ref 31–73)
PLATELET # BLD AUTO: 226 X10^3/UL (ref 140–400)
POTASSIUM SERPL-SCNC: 4 MMOL/L (ref 3.5–5.1)
PROT SERPL-MCNC: 7.6 G/DL (ref 6.4–8.2)
RBC # BLD AUTO: 4.03 X10^6/UL (ref 3.5–5.4)
SODIUM SERPL-SCNC: 141 MMOL/L (ref 136–145)
WBC # BLD AUTO: 6.9 X10^3/UL (ref 4–11)

## 2020-07-23 PROCEDURE — 99284 EMERGENCY DEPT VISIT MOD MDM: CPT

## 2020-07-23 PROCEDURE — 80053 COMPREHEN METABOLIC PANEL: CPT

## 2020-07-23 PROCEDURE — 87070 CULTURE OTHR SPECIMN AEROBIC: CPT

## 2020-07-23 PROCEDURE — 85025 COMPLETE CBC W/AUTO DIFF WBC: CPT

## 2020-07-23 PROCEDURE — 71045 X-RAY EXAM CHEST 1 VIEW: CPT

## 2020-07-23 PROCEDURE — 36415 COLL VENOUS BLD VENIPUNCTURE: CPT

## 2020-07-23 PROCEDURE — 87880 STREP A ASSAY W/OPTIC: CPT

## 2020-07-23 NOTE — RAD
EXAM: Chest, single view.

 

HISTORY: Shortness of breath.

 

COMPARISON: None.

 

FINDINGS: A frontal view of the chest is obtained. There is no infiltrate,

pleural effusion or pneumothorax. The heart is normal in size.

 

IMPRESSION: No acute pulmonary finding.

 

Electronically signed by: Harika Whitaker MD (7/23/2020 12:54 PM) [id number]

## 2020-07-23 NOTE — PHYS DOC
Past History


Past Medical History:  Asthma, Hypothyroid


Past Surgical History:  Other


Smoking:  Cigarettes, Less than 1pk/day


Alcohol Use:  None


Drug Use:  None





General Adult


EDM:


Chief Complaint:  FEVER





HPI:


HPI:


24-year-old female presents with cough, body aches, shortness of breath for last

4 days.  She is not sure if she had a fever.  She has no specific COVID-19 

exposures, but is worried about this.  She does not have any chest pain.  No 

significant history of chronic disease.





Review of Systems:


Review of Systems:


Constitutional:  Denies fever or chills 


Eyes:  Denies change in visual acuity 


HENT:  nasal congestion and sore throat 


Respiratory: Cough with shortness of breath 


Cardiovascular:  Denies chest pain or edema 


GI:  Denies abdominal pain, nausea, vomiting, bloody stools or diarrhea 


: Denies dysuria 


Musculoskeletal:  Denies back pain or joint pain 


Integument:  Denies rash 


Neurologic:  Denies headache, focal weakness or sensory changes 


Endocrine:  Denies polyuria or polydipsia 


Lymphatic:  Denies swollen glands 


Psychiatric:  Denies depression or anxiety





Heart Score:


Risk Factors:


Risk Factors:  DM, Current or recent (<one month) smoker, HTN, HLP, family 

history of CAD, obesity.


Risk Scores:


Score 0 - 3:  2.5% MACE over next 6 weeks - Discharge Home


Score 4 - 6:  20.3% MACE over next 6 weeks - Admit for Clinical Observation


Score 7 - 10:  72.7% MACE over next 6 weeks - Early Invasive Strategies





Current Medications:


Current Meds:





Current Medications








 Medications


  (Trade)  Dose


 Ordered  Sig/[facility]  Start Time


 Stop Time Status Last Admin


Dose Admin


 


 Sodium Chloride  1,000 ml @ 


 1,000 mls/hr  1X  ONCE  7/23/20 12:30


 7/23/20 13:29 DC 7/23/20 12:30


1,000 MLS/HR











Allergies:


Allergies:





Allergies








Coded Allergies Type Severity Reaction Last Updated Verified


 


  hydrocodone Allergy Unknown  11/7/18 Yes


 


  amoxicillin Adverse Reaction Intermediate  6/14/19 Yes


 


  cefaclor Adverse Reaction Intermediate  6/14/19 Yes











Physical Exam:


PE:





Constitutional: Well developed, well nourished, no acute distress, non-toxic 

appearance. []


HENT: Normocephalic, atraumatic, bilateral external ears normal, oropharynx 

moist, no oral exudates, nose normal. []


Eyes: PERRLA, EOMI, conjunctiva normal, no discharge. [] 


Neck: Normal range of motion, no tenderness, supple, no stridor. [] 


Cardiovascular: Deferred due to isolation precautions []


Lungs & Thorax: Deferred due to isolation precautions []


Abdomen: Bowel sounds normal, soft, no tenderness, no masses, no pulsatile 

masses. [] 


Skin: Warm, dry, no erythema, no rash. [] 


Back: No tenderness, no CVA tenderness. [] 


Extremities: No tenderness, no cyanosis, no clubbing, ROM intact, no edema. [] 


Neurologic: Alert and oriented X 3, normal motor function, normal sensory 

function, no focal deficits noted. []


Psychologic: Affect normal, judgement normal, mood normal. []





Current Patient Data:


Labs:





                                Laboratory Tests








Test


 7/23/20


12:45


 


White Blood Count


 6.9 x10^3/uL


(4.0-11.0)


 


Red Blood Count


 4.03 x10^6/uL


(3.50-5.40)


 


Hemoglobin


 12.6 g/dL


(12.0-15.5)


 


Hematocrit


 37.1 %


(36.0-47.0)


 


Mean Corpuscular Volume


 92 fL ()





 


Mean Corpuscular Hemoglobin 31 pg (25-35)  


 


Mean Corpuscular Hemoglobin


Concent 34 g/dL


(31-37)


 


Red Cell Distribution Width


 14.0 %


(11.5-14.5)


 


Platelet Count


 226 x10^3/uL


(140-400)


 


Neutrophils (%) (Auto) 50 % (31-73)  


 


Lymphocytes (%) (Auto) 40 % (24-48)  


 


Monocytes (%) (Auto) 7 % (0-9)  


 


Eosinophils (%) (Auto) 3 % (0-3)  


 


Basophils (%) (Auto) 0 % (0-3)  


 


Neutrophils # (Auto)


 3.4 x10^3uL


(1.8-7.7)


 


Lymphocytes # (Auto)


 2.8 x10^3/uL


(1.0-4.8)


 


Monocytes # (Auto)


 0.5 x10^3/uL


(0.0-1.1)


 


Eosinophils # (Auto)


 0.2 x10^3/uL


(0.0-0.7)


 


Basophils # (Auto)


 0.0 x10^3/uL


(0.0-0.2)


 


Sodium Level


 141 mmol/L


(136-145)


 


Potassium Level


 4.0 mmol/L


(3.5-5.1)


 


Chloride Level


 106 mmol/L


()


 


Carbon Dioxide Level


 28 mmol/L


(21-32)


 


Anion Gap 7 (6-14)  


 


Blood Urea Nitrogen


 12 mg/dL


(7-20)


 


Creatinine


 1.1 mg/dL


(0.6-1.0)  H


 


Estimated GFR


(Cockcroft-Gault) 61.0  





 


BUN/Creatinine Ratio 11 (6-20)  


 


Glucose Level


 99 mg/dL


(70-99)


 


Calcium Level


 8.6 mg/dL


(8.5-10.1)


 


Total Bilirubin


 0.3 mg/dL


(0.2-1.0)


 


Aspartate Amino Transferase


(AST) 21 U/L (15-37)





 


Alanine Aminotransferase (ALT)


 30 U/L (14-59)





 


Alkaline Phosphatase


 70 U/L


()


 


Total Protein


 7.6 g/dL


(6.4-8.2)


 


Albumin


 3.6 g/dL


(3.4-5.0)


 


Albumin/Globulin Ratio


 0.9 (1.0-1.7)


L











EKG:


EKG:


[]





Radiology/Procedures:


Radiology/Procedures:


[]


Impressions:


EXAM: Chest, single view.


 


HISTORY: Shortness of breath.


 


COMPARISON: None.


 


FINDINGS: A frontal view of the chest is obtained. There is no infiltrate,


pleural effusion or pneumothorax. The heart is normal in size.


 


IMPRESSION: No acute pulmonary finding.


 


Electronically signed by: Harika Whitaker MD (7/23/2020 12:54 PM) [id number]














DICTATED AND SIGNED BY:     HARIKA WHITAKER MD


DATE:     07/23/20 1637





CC: ALEXANDRA CARLOS DO; ANDREY ABERNATHY MD ~





Course & Med Decision Making:


Course & Med Decision Making


Pertinent Labs and Imaging studies reviewed. (See chart for details)


The patient's labs are unremarkable.  Her chest x-ray is negative for acute 

findings.  We have tested her for COVID-19.  Rapid strep is negative.  She does 

not meet admission criteria.  I have given the patient advice about self-

isolation.  She is stable for discharge at this time.


[]





Dragon Disclaimer:


Dragon Disclaimer:


This electronic medical record was generated, in whole or in part, using a voice

recognition dictation system.





Departure


Departure:


Impression:  


   Primary Impression:  


   Suspected 2019-nCoV infection


Disposition:  01 HOME/RESIDENCE PRIOR TO ADM


Condition:  STABLE


Referrals:  


ANDREY ABERNATHY MD (PCP)





Additional Instructions:  


You have been tested for or diagnosed with COVID-19. It is an infection caused 

by a new type 





of coronavirus. COVID-19 will cause cold-like or mild flu symptoms in most. It 

can cause 


more severe symptoms like problems breathing in some.





There is no treatment for COVID-19. The body will clear the infection over time.

Self-care 


will help to ease discomfort.





Steps to Take:


Self-Care


Rest as needed. Healthy habits may help you feel better. Steps include:





Choose healthy foods including fruits and vegetables. Drink water throughout the

day.


Get plenty of sleep each night.


If you smoke, try to quit. It may ease breathing.


Avoid alcohol.


Keep Others Healthy


The virus can spread to others. Droplets are released every time you sneeze or 

cough. The 


droplets can get into the mouth, nose, or eyes of people near you and lead to 

infection. To 


lower the chances of spreading COVID-19 to others:





Stay at home until your doctor has said it is safe to leave. If you tested 

positive this 


will mean staying isolated until both of the following are true:





At least 7 days have passed since the start of illness.


You are free of fever for at least 72 hours without the use of medicine.


During this time:





 - Avoid public areas, events, or transportation. Do not return to work or 

school until your 





doctor has said it is safe to do so.


 - Call ahead if you need to go to a medical center. Let them know you may have 

COVID-19. It 





will help them guide you where to go. They may also ask you to wear a facemask 

when you come 





to the office.


 - If you call for emergency medical services, let them know you may have COVID-

19.


While at home:





 - Try to avoid close contact with others. Stay about 6 feet away.


 - If possible, spend most of your time in a separate room from others.


 - Use a face mask if you will be in close contact with others such as sharing a

room or 


vehicle.


 - Have someone wipe down common surfaces in the home. Use household  

every day on 


areas like doorknobs, counters, or sinks.


 - Cough or sneeze into a tissue. Throw the tissue away right after use. If a 

tissue is not 


available, cough or sneeze into your elbow.


 - Wash your hands often. Wash them after sneezing or coughing. Use soap and 

water and wash 


for at least 20 seconds. Alcohol based hand  can be used if soap and 

water is not 


available.


 - Do not prepare food for others. Avoid sharing personal items like forks, 

spoons, or 


toothbrushes.


 - Avoid close contact with pets while you are sick. There is no evidence of the

virus 


passing to pets. This is a safety step until more is known about this virus.


Isolation can be frustrating. Social interaction can help. Keep in touch with 

friends and 


family through phone and tech options. You can still interact with others in 

your home, just 





keep a safe distance of about 6 feet.





Follow-up:


Your doctors office will check in with you to see if there are any changes in 

your health. 


You may be asked to keep track of symptoms to share with them. They will also 

let you know 


when you are clear to be in public again.





Problems to Look Out For:


Contact your doctor if your recovery is not going as you expect. Get emergency 

care if you 


have problems such as:





 - Trouble breathing


 - Nonstop chest pain or pressure


 - Changes in awareness, confusion, or problems waking


 - Lips or face have bluish color


 - Worsening of symptoms


If you think you have an emergency, call for emergency medical services right 

away.





As taken from [facility]





Justification of Admission:


Justification of Admission:


Justification of Admission Dx:  N/A











ALEXANDRA CARLOS DO                 Jul 23, 2020 13:39

## 2020-09-21 ENCOUNTER — HOSPITAL ENCOUNTER (EMERGENCY)
Dept: HOSPITAL 63 - ER | Age: 25
Discharge: HOME | End: 2020-09-21
Payer: COMMERCIAL

## 2020-09-21 VITALS — HEIGHT: 67 IN | BODY MASS INDEX: 25.5 KG/M2 | WEIGHT: 162.48 LBS

## 2020-09-21 VITALS — DIASTOLIC BLOOD PRESSURE: 62 MMHG | SYSTOLIC BLOOD PRESSURE: 111 MMHG

## 2020-09-21 DIAGNOSIS — O99.331: ICD-10-CM

## 2020-09-21 DIAGNOSIS — E03.9: ICD-10-CM

## 2020-09-21 DIAGNOSIS — O99.281: ICD-10-CM

## 2020-09-21 DIAGNOSIS — O02.1: Primary | ICD-10-CM

## 2020-09-21 DIAGNOSIS — Z3A.01: ICD-10-CM

## 2020-09-21 DIAGNOSIS — Z88.5: ICD-10-CM

## 2020-09-21 DIAGNOSIS — Z88.1: ICD-10-CM

## 2020-09-21 DIAGNOSIS — O99.511: ICD-10-CM

## 2020-09-21 DIAGNOSIS — J45.909: ICD-10-CM

## 2020-09-21 LAB
ANION GAP SERPL CALC-SCNC: 5 MMOL/L (ref 6–14)
APTT PPP: YELLOW S
BACTERIA #/AREA URNS HPF: (no result) /HPF
BASOPHILS # BLD AUTO: 0 X10^3/UL (ref 0–0.2)
BASOPHILS NFR BLD: 0 % (ref 0–3)
BILIRUB UR QL STRIP: (no result)
CA-I SERPL ISE-MCNC: 12 MG/DL (ref 7–20)
CALCIUM SERPL-MCNC: 8.3 MG/DL (ref 8.5–10.1)
CHLORIDE SERPL-SCNC: 105 MMOL/L (ref 98–107)
CO2 SERPL-SCNC: 29 MMOL/L (ref 21–32)
CREAT SERPL-MCNC: 1 MG/DL (ref 0.6–1)
EOSINOPHIL NFR BLD: 0.1 X10^3/UL (ref 0–0.7)
EOSINOPHIL NFR BLD: 1 % (ref 0–3)
ERYTHROCYTE [DISTWIDTH] IN BLOOD BY AUTOMATED COUNT: 14 % (ref 11.5–14.5)
FIBRINOGEN PPP-MCNC: (no result) MG/DL
GFR SERPLBLD BASED ON 1.73 SQ M-ARVRAT: 67.6 ML/MIN
GLUCOSE SERPL-MCNC: 89 MG/DL (ref 70–99)
GLUCOSE UR STRIP-MCNC: (no result) MG/DL
HCT VFR BLD CALC: 36 % (ref 36–47)
HGB BLD-MCNC: 12.2 G/DL (ref 12–15.5)
LYMPHOCYTES # BLD: 2.7 X10^3/UL (ref 1–4.8)
LYMPHOCYTES NFR BLD AUTO: 37 % (ref 24–48)
MCH RBC QN AUTO: 31 PG (ref 25–35)
MCHC RBC AUTO-ENTMCNC: 34 G/DL (ref 31–37)
MCV RBC AUTO: 92 FL (ref 79–100)
MONO #: 0.4 X10^3/UL (ref 0–1.1)
MONOCYTES NFR BLD: 5 % (ref 0–9)
NEUT #: 4.2 X10^3UL (ref 1.8–7.7)
NEUTROPHILS NFR BLD AUTO: 56 % (ref 31–73)
NITRITE UR QL STRIP: (no result)
PLATELET # BLD AUTO: 202 X10^3/UL (ref 140–400)
POTASSIUM SERPL-SCNC: 3.5 MMOL/L (ref 3.5–5.1)
RBC # BLD AUTO: 3.91 X10^6/UL (ref 3.5–5.4)
RBC #/AREA URNS HPF: (no result) /HPF (ref 0–2)
SODIUM SERPL-SCNC: 139 MMOL/L (ref 136–145)
SP GR UR STRIP: 1.02
SQUAMOUS #/AREA URNS LPF: (no result) /LPF
UROBILINOGEN UR-MCNC: 0.2 MG/DL
WBC # BLD AUTO: 7.5 X10^3/UL (ref 4–11)
WBC #/AREA URNS HPF: (no result) /HPF (ref 0–4)

## 2020-09-21 PROCEDURE — 76801 OB US < 14 WKS SINGLE FETUS: CPT

## 2020-09-21 PROCEDURE — 85025 COMPLETE CBC W/AUTO DIFF WBC: CPT

## 2020-09-21 PROCEDURE — 87086 URINE CULTURE/COLONY COUNT: CPT

## 2020-09-21 PROCEDURE — 81001 URINALYSIS AUTO W/SCOPE: CPT

## 2020-09-21 PROCEDURE — 99284 EMERGENCY DEPT VISIT MOD MDM: CPT

## 2020-09-21 PROCEDURE — 80048 BASIC METABOLIC PNL TOTAL CA: CPT

## 2020-09-21 PROCEDURE — 84702 CHORIONIC GONADOTROPIN TEST: CPT

## 2020-09-21 PROCEDURE — 36415 COLL VENOUS BLD VENIPUNCTURE: CPT

## 2020-09-21 PROCEDURE — 81025 URINE PREGNANCY TEST: CPT

## 2020-09-21 PROCEDURE — 76817 TRANSVAGINAL US OBSTETRIC: CPT

## 2020-09-21 NOTE — RAD
EXAM: ULTRASOUND PELVIS

 

INDICATION: Reason: vaginal bleeding in preg / Spl. Instructions:  / 

History: .  Last menstrual period was 8/9/2020.

 

COMPARISON: None available.

 

TECHNIQUE: Transabdominal and endovaginal sonography was performed.

 

FINDINGS:

The uterus measures 9.6 x 5.8 x 4.7 cm.  The endometrium measures 0.4 cm. 

No endometrial gestational sac is identified. There is no focal myometrial

abnormality

 

The right ovary measures  2.2 x 2.1 x 3.1cm. The left ovary measures 2.3 x

2.5 x 2.6 cm.  Flow seen to both ovaries.

 

There is small volume free pelvic fluid, simple in appearance.

 

IMPRESSION:

1.  No definite intra or extra uterine pregnancy. Findings could be due to

early pregnancy, nonviable pregnancy, cannot exclude ectopic. Follow-up in

one week with serial hCG and ultrasound is recommended. 

2.  Otherwise, normal sonographic survey of the uterus and adnexa. No 

evidence for torsion.

3.  Small volume free pelvic fluid.

 

Electronically signed by: Kaiden Forte MD (9/21/2020 1:31 PM) [id number]

## 2020-09-21 NOTE — PHYS DOC
Past History


Past Medical History:  Asthma, Hypothyroid


Past Surgical History:  Other


Additional Past Surgical Histo:  left elbow


Smoking:  Cigarettes, Less than 1pk/day


Alcohol Use:  None


Drug Use:  None





General Adult


EDM:


Chief Complaint:  VAGINAL BLEEDING PREGNANCY





HPI:


HPI:


26 yo  at 6w1d (based on FDP ), presents the ED with complaints of 

light orange vaginal bleeding that started on Saturday and has persisted.  

Reports history of spontaneous  in first tm pregnancy over a year ago.  

Has an appointment to establish OB/GYN care with Dr. Mann-called him today 

and was referred to the ED to rule out an ectopic pregnancy.  Is currently not 

taking any medications.  Denies any cocaine or drug abuse.  No history of a 

complicated birth/pregnancy with prior 2 children. no h/o ectopic pregnancies. 

Reports B positive blood type.





Review of Systems:


Review of Systems:


Constitutional:  Denies fever or chills 


Eyes:  Denies change in visual acuity 


HENT:  Denies nasal congestion or sore throat 


Respiratory:  Denies cough or shortness of breath 


Cardiovascular:  Denies chest pain or edema 


GI:  Denies abdominal pain, nausea, vomiting, bloody stools or diarrhea, vaginal

itching/odor 


: Denies dysuria or hematuria


Musculoskeletal:  Denies back pain or joint pain 


Integument:  Denies rash 


Neurologic:  Denies headache, focal weakness or sensory changes 


Endocrine:  Denies polyuria or polydipsia 


Lymphatic:  Denies swollen glands 


Psychiatric:  Denies depression or anxiety





Heart Score:


Risk Factors:


Risk Factors:  DM, Current or recent (<one month) smoker, HTN, HLP, family 

history of CAD, obesity.


Risk Scores:


Score 0 - 3:  2.5% MACE over next 6 weeks - Discharge Home


Score 4 - 6:  20.3% MACE over next 6 weeks - Admit for Clinical Observation


Score 7 - 10:  72.7% MACE over next 6 weeks - Early Invasive Strategies





Allergies:


Allergies:





Allergies








Coded Allergies Type Severity Reaction Last Updated Verified


 


  hydrocodone Allergy Unknown  20 Yes


 


  amoxicillin Adverse Reaction Intermediate  20 Yes


 


  cefaclor Adverse Reaction Intermediate  20 Yes











Physical Exam:


PE:





Constitutional: Well developed, well nourished, no acute distress, non-toxic 

appearance. []


HENT: Normocephalic, atraumatic, 


Eyes: EOMI, conjunctiva normal, no discharge. [] 


Neck: Normal range of motion, supple, 


Cardiovascular:Heart rate regular rhythm, no murmur []


Lungs & Thorax:  Bilateral breath sounds clear to auscultation []


Abdomen: Bowel sounds normal, soft, no tenderness, no masses, no pulsatile 

masses. [] 


Skin: Warm, dry, no erythema, no rash. [] 


Back: No tenderness, no CVA tenderness. [] 


Extremities: No tenderness, no cyanosis, no clubbing, ROM intact, no edema. [] 


Neurologic: Alert and oriented X 3, normal motor function, normal sensory fun

ction, no focal deficits noted. []


Psychologic: Affect normal, judgement normal, mood normal. []


Pelvic: Mild vaginal bleeding in vaginal vault, multiparous cervix, cervical os 

closed, external genitalia normal, no CMT or adnexal tenderness, no odorous 

discharge





Current Patient Data:


Labs:





                                Laboratory Tests








Test


 20


12:26


 


POC Urine HCG, Qualitative


 hcg positive


(Negative)











EKG:


EKG:


[]





Radiology/Procedures:


Radiology/Procedures:


[]IMAGING REPORT





                                     Signed





PATIENT: OLY HAMPTON NACCOUNT: [id number]     MRN#: [id number]


: 1995           LOCATION: ER              AGE: 25


SEX: F                    EXAM DT: 20         ACCESSION#: [id number]


STATUS: REG ER            ORD. PHYSICIAN: NEREYDA BELL DO


REASON: vaginal bleeding in preg


PROCEDURE: OB <14 WKS W/TV





EXAM: ULTRASOUND PELVIS


 


INDICATION: Reason: vaginal bleeding in preg / Spl. Instructions:  / 


History: .  Last menstrual period was 2020.


 


COMPARISON: None available.


 


TECHNIQUE: Transabdominal and endovaginal sonography was performed.


 


FINDINGS:


The uterus measures 9.6 x 5.8 x 4.7 cm.  The endometrium measures 0.4 cm. 


No endometrial gestational sac is identified. There is no focal myometrial


abnormality


 


The right ovary measures  2.2 x 2.1 x 3.1cm. The left ovary measures 2.3 x


2.5 x 2.6 cm.  Flow seen to both ovaries.


 


There is small volume free pelvic fluid, simple in appearance.


 


IMPRESSION:


1.  No definite intra or extra uterine pregnancy. Findings could be due to


early pregnancy, nonviable pregnancy, cannot exclude ectopic. Follow-up in


one week with serial hCG and ultrasound is recommended. 


2.  Otherwise, normal sonographic survey of the uterus and adnexa. No 


evidence for torsion.


3.  Small volume free pelvic fluid.


 


Electronically signed by: Kaiden Ventura MD (2020 1:31 PM) [id number]














DICTATED AND SIGNED BY:     KAIDEN VENTURA MD


DATE:     20 5600





CC: [facility]NO; [facility] ~





Course & Med Decision Making:


Course & Med Decision Making


Pertinent Labs and Imaging studies reviewed. (See chart for details)





Concern for missed versus incomplete miscarriage with vaginal bleeding for the 

past 2 days.  Patient in no distress, vitally stable.  Ectopic on differential 

although patient with no active pain. Hcg 115. Will recommend patient follow-up 

in 48 hours for repeat hCG-if downtrending, missed ab.  Encouraged urgent 

outpatient follow-up with PMD and OB.  Life-threatening processes were 

considered but are low suspicion at this time, given history and physical exam. 

Pt was educated on all prescription medications and adverse effects.  All 

patient's questions were answered and pt was stable at time of discharge.





Differential includes aortic dissection, aortic aneurysm, acute coronary 

syndrome, surgical abdomen (appendicitis, cholecystitis, ischemic bowel, leobardo

ulated hernia, etc), bowel obstruction or volvulus, bladder outlet obstruction, 

gastrointestinal bleeding, inflammatory bowel disease, peptic ulcer disease, 

sepsis, diverticular disease, ureterolithiasis,  nephrolithiasis, ovarian or 

testicular torsion, ectopic pregnancy, vaginal hemorrhage of infection





I spoken with the patient and her caregivers.  I explained the patient's condi

tion, diagnoses and treatment plan based on the information available to me at 

this time.  I have answered the patient and her caregiver's questions and 

addressed any concerns.  The patient and her caregivers have a good 

understanding of patient's diagnosis, condition and treatment plan as can be 

expected at this point.  Vital signs have been stable.  Patient's condition is 

stable and appropriate for discharge from the emergency department. 





Patient will pursue further outpatient evaluation with primary care physician or

 other designated or consulting physician as outlined in the discharge 

instructions.  The patient and/or caregivers are agreeable to this plan of care 

and follow-up instructions have been explained in detail.  The patient and/or 

caregivers have received these instructions in written form and have expressed 

an understanding of the discharge instructions.  The patient and/or caregivers 

are aware that any significant change of condition or worsening of symptoms 

should prompt immediate return to this or the closest emergency department or 

call to 251.





Cristian Disclaimer:


Dragon Disclaimer:


This electronic medical record was generated, in whole or in part, using a voice

 recognition dictation system.





Departure


Departure:


Impression:  


   Primary Impression:  


   Missed 


   Additional Impression:  


   Vaginal bleeding during pregnancy


Disposition:   HOME/RESIDENCE PRIOR TO ADM


Condition:  STABLE


Referrals:  


PCP,NO (PCP)


Patient Instructions:  Miscarriage





Additional Instructions:  


[facility]


[address], [address]


[place] [address]


[phone]





EMERGENCY DEPARTMENT GENERAL DISCHARGE INSTRUCTIONS





Thank you for coming to [facility] Emergency Department (ED) 

today and 


trusting us with you care.  We trust that you had a positivie experience in our 

Emergency 


Department.  If you wish to speak to the department management, you may call the

 sirector at 


[phone].





YOUR FOLLOW UP INSTRUCTIONS ARE AS FOLLOWS:





1.  Do you have a private Doctor?  If you do not have a private doctir, please 

ask for a 


resource list of physicians or clinics that may be able to assist you with 

follow up care.





2.  The Emergency Physicain has interpreted your x-rays.  The X-Ray specialist 

will also 


review them.  If there is a change in the findingd, you will be notified in 48 

hours when at 


all possible.





3.  A lab test or culture has been done, your results will be reviewed and you w

ill be 


notified if you need a change in treatment.





ADDITIONAL INSTRUCTIONS AND INFORMATION:





1.  Your care today has been supervised by a physician who is specially trained 

in emergency 


care.  Many problems require more than one evaluation for a complete diagnosis 

and 


treatment.  We recommend that you schedule your follow up appointment as 

recommended to 


ensure complete treatment of you illness or injury.  If you are unable to obtain

 follow up 


care and continue to have a problem, or if your consition worsens, we recommend 

that you 


return to the ED.





2.  We are not able to safelymdetermine your condition over the phone nor are we

 able to 


give sound medical advice over the phone.  For these safety reasons, if you call

 for medical 


advice we will ask you to come to the ED for further evaluation.





3.  If you have any questions regarding these discharge instructions please call

 the ED at 


[phone].





SAFETY INFORMATION:





In the interest of safety, wellness, and injury prevention; we encourage you to 

wear your 


sealbelt, if you smoke; quite smoking, and we encourage family to use a 

protective helmet 


for bicycling and other sporting events that present an increased risk for head 

injusry.





IF YOUR SYMPTOMS WORSEN OR NEW SYMPTOMS DEVELOP, OR YOU HAVE CONCERNS ABOUT YOUR

 CONDITION; 


OR IF YOUR CONDITION WORSENS WHILE YOU ARE WAITING FOR YOUR FOLLOW UP 

APPOINTMENT; EITHER 


CONTACT YOUR PRIMARY CARE DOCTOR, THE PHYSICIAN WHOSE NAME AND NUMBER YOU WERE 

GIVEN, OR 


RETURN TO THE ED IMMEDIATELY.





Justification of Admission:


Justification of Admission:


Justification of Admission Dx:  N/A











NEREYDA BELL DO               Sep 21, 2020 12:46

## 2021-01-07 ENCOUNTER — HOSPITAL ENCOUNTER (EMERGENCY)
Dept: HOSPITAL 63 - ER | Age: 26
Discharge: HOME | End: 2021-01-07
Payer: COMMERCIAL

## 2021-01-07 VITALS
DIASTOLIC BLOOD PRESSURE: 74 MMHG | SYSTOLIC BLOOD PRESSURE: 128 MMHG | SYSTOLIC BLOOD PRESSURE: 128 MMHG | DIASTOLIC BLOOD PRESSURE: 74 MMHG | SYSTOLIC BLOOD PRESSURE: 128 MMHG

## 2021-01-07 VITALS — WEIGHT: 149.91 LBS | HEIGHT: 67 IN | BODY MASS INDEX: 23.53 KG/M2

## 2021-01-07 DIAGNOSIS — R07.89: Primary | ICD-10-CM

## 2021-01-07 DIAGNOSIS — Z88.5: ICD-10-CM

## 2021-01-07 DIAGNOSIS — J45.909: ICD-10-CM

## 2021-01-07 DIAGNOSIS — E03.9: ICD-10-CM

## 2021-01-07 DIAGNOSIS — F17.210: ICD-10-CM

## 2021-01-07 DIAGNOSIS — F41.9: ICD-10-CM

## 2021-01-07 DIAGNOSIS — Z88.1: ICD-10-CM

## 2021-01-07 LAB
ALBUMIN SERPL-MCNC: 3.9 G/DL (ref 3.4–5)
ALBUMIN/GLOB SERPL: 1.2 {RATIO} (ref 1–1.7)
ALP SERPL-CCNC: 66 U/L (ref 46–116)
ALT SERPL-CCNC: 20 U/L (ref 14–59)
AMPHETAMINE/METHAMPHETAMINE: (no result)
ANION GAP SERPL CALC-SCNC: 6 MMOL/L (ref 6–14)
AST SERPL-CCNC: 12 U/L (ref 15–37)
BARBITURATES UR-MCNC: (no result) UG/ML
BASOPHILS # BLD AUTO: 0 X10^3/UL (ref 0–0.2)
BASOPHILS NFR BLD: 1 % (ref 0–3)
BENZODIAZ UR-MCNC: (no result) UG/L
BILIRUB SERPL-MCNC: 0.1 MG/DL (ref 0.2–1)
BUN/CREAT SERPL: 16 (ref 6–20)
CA-I SERPL ISE-MCNC: 14 MG/DL (ref 7–20)
CALCIUM SERPL-MCNC: 8.3 MG/DL (ref 8.5–10.1)
CANNABINOIDS UR-MCNC: (no result) UG/L
CHLORIDE SERPL-SCNC: 102 MMOL/L (ref 98–107)
CO2 SERPL-SCNC: 28 MMOL/L (ref 21–32)
COCAINE UR-MCNC: (no result) NG/ML
CREAT SERPL-MCNC: 0.9 MG/DL (ref 0.6–1)
EOSINOPHIL NFR BLD: 0.1 X10^3/UL (ref 0–0.7)
EOSINOPHIL NFR BLD: 2 % (ref 0–3)
ERYTHROCYTE [DISTWIDTH] IN BLOOD BY AUTOMATED COUNT: 14.3 % (ref 11.5–14.5)
GFR SERPLBLD BASED ON 1.73 SQ M-ARVRAT: 76.3 ML/MIN
GLOBULIN SER-MCNC: 3.2 G/DL (ref 2.2–3.8)
GLUCOSE SERPL-MCNC: 79 MG/DL (ref 70–99)
HCT VFR BLD CALC: 37.1 % (ref 36–47)
HGB BLD-MCNC: 12.6 G/DL (ref 12–15.5)
LYMPHOCYTES # BLD: 2.4 X10^3/UL (ref 1–4.8)
LYMPHOCYTES NFR BLD AUTO: 35 % (ref 24–48)
MCH RBC QN AUTO: 31 PG (ref 25–35)
MCHC RBC AUTO-ENTMCNC: 34 G/DL (ref 31–37)
MCV RBC AUTO: 92 FL (ref 79–100)
METHADONE SERPL-MCNC: (no result) NG/ML
MONO #: 0.3 X10^3/UL (ref 0–1.1)
MONOCYTES NFR BLD: 5 % (ref 0–9)
NEUT #: 4 X10^3UL (ref 1.8–7.7)
NEUTROPHILS NFR BLD AUTO: 58 % (ref 31–73)
OPIATES UR-MCNC: (no result) NG/ML
PCP SERPL-MCNC: (no result) MG/DL
PLATELET # BLD AUTO: 251 X10^3/UL (ref 140–400)
POTASSIUM SERPL-SCNC: 4.3 MMOL/L (ref 3.5–5.1)
PROT SERPL-MCNC: 7.1 G/DL (ref 6.4–8.2)
RBC # BLD AUTO: 4.03 X10^6/UL (ref 3.5–5.4)
SODIUM SERPL-SCNC: 136 MMOL/L (ref 136–145)
WBC # BLD AUTO: 6.9 X10^3/UL (ref 4–11)

## 2021-01-07 PROCEDURE — 84484 ASSAY OF TROPONIN QUANT: CPT

## 2021-01-07 PROCEDURE — 80053 COMPREHEN METABOLIC PANEL: CPT

## 2021-01-07 PROCEDURE — 80307 DRUG TEST PRSMV CHEM ANLYZR: CPT

## 2021-01-07 PROCEDURE — 85025 COMPLETE CBC W/AUTO DIFF WBC: CPT

## 2021-01-07 PROCEDURE — 71045 X-RAY EXAM CHEST 1 VIEW: CPT

## 2021-01-07 PROCEDURE — 36415 COLL VENOUS BLD VENIPUNCTURE: CPT

## 2021-01-07 PROCEDURE — 93005 ELECTROCARDIOGRAM TRACING: CPT

## 2021-01-07 PROCEDURE — 96374 THER/PROPH/DIAG INJ IV PUSH: CPT

## 2021-01-07 PROCEDURE — 85379 FIBRIN DEGRADATION QUANT: CPT

## 2021-01-07 PROCEDURE — 81025 URINE PREGNANCY TEST: CPT

## 2021-01-07 PROCEDURE — 99285 EMERGENCY DEPT VISIT HI MDM: CPT

## 2021-01-07 NOTE — RAD
EXAM: Chest, single view.



HISTORY: Chest pain.



COMPARISON: 7/23/2020



FINDINGS: A frontal view of the chest is obtained. There is no infiltrate, pleural effusion or pneumo
thorax. The heart is normal in size.



IMPRESSION: No acute pulmonary finding.



Electronically signed by: Harika Whitaker MD (1/7/2021 9:22 AM) [id number]

## 2021-01-07 NOTE — PHYS DOC
Past History


Past Medical History:  Anxiety, Asthma, Hypothyroid


Past Surgical History:  Other


Additional Past Surgical Histo:  left elbow


Smoking:  Cigarettes, Less than 1pk/day


Alcohol Use:  None


Drug Use:  Cocaine, Marijuana, Methamphetamine





Adult General


HPI


HPI





Patient is a 25-year-old female presenting for left-sided chest pain.  Onset was

this morning shortly after waking up without exertion.  Nothing known makes 

better, walking around and "everything" makes worse.  Patient describes sharp 

pain deep in left breast that radiates to left shoulder and back between her 

shoulder blades.  She has no formal history of cardiac disease but admits 

tobacco use, currently uses estrogen-based OCPs and has distant history of IV 

drug use and cocaine abuse.  Patient is tearful, admits history of anxiety and 

states her anxiety is making current situation worse but she was concerned when 

she was driving to work and continued to have chest pain prompting her to come 

to our ER for evaluation instead





Review of Systems


Review of Systems


Fourteen body systems of review of systems have been reviewed. See HPI for 

pertinent positives and negative responses, other wise all other systems are n

egative, non-pertinent or non-contributory





Allergies


Allergies





Allergies








Coded Allergies Type Severity Reaction Last Updated Verified


 


  hydrocodone Allergy Unknown  7/23/20 Yes


 


  amoxicillin Adverse Reaction Intermediate  7/23/20 Yes


 


  cefaclor Adverse Reaction Intermediate  7/23/20 Yes











Physical Exam


Physical Exam


Constitutional: Well developed, well nourished, no acute distress, non-toxic 

appearance. 


HENT: Normocephalic, atraumatic, bilateral external ears normal, oropharynx 

moist, no oral exudates, nose normal. 


Eyes: PERRLA, EOMI, conjunctiva normal, no discharge.  


Neck: Normal range of motion, no tenderness, supple, no stridor.  


Cardiovascular: Heart rate regular, sinus rhythm, no murmurs rubs or gallops


Lungs & Thorax:  Bilateral breath sounds clear to auscultation 


Abdomen: Bowel sounds normal, soft, no tenderness, no masses, no pulsatile 

masses.  Nonsurgical abdomen, no peritoneal signs


Skin: Warm, dry, no erythema, no rash.  


Back: No tenderness, no CVA tenderness.  


Extremities: No tenderness, no cyanosis, no clubbing, ROM intact, no edema.  


Neurologic: Alert and oriented X 3, grossly normal motor & sensory function, no 

focal deficits noted. 


Psychologic: Tearful affect, anxious mood





Current Patient Data


Vital Signs





Vital Signs








  Date Time  Temp Pulse Resp B/P (MAP) Pulse Ox O2 Delivery O2 Flow Rate FiO2


 


1/7/21 09:32 98.4 105 22 128/74 (92) 99 Room Air  








Lab Results





Laboratory Tests








Test


 1/7/21


09:10 1/7/21


09:22


 


White Blood Count 6.9 x10^3/uL  


 


Red Blood Count 4.03 x10^6/uL  


 


Hemoglobin 12.6 g/dL  


 


Hematocrit 37.1 %  


 


Mean Corpuscular Volume 92 fL  


 


Mean Corpuscular Hemoglobin 31 pg  


 


Mean Corpuscular Hemoglobin


Concent 34 g/dL 


 





 


Red Cell Distribution Width 14.3 %  


 


Platelet Count 251 x10^3/uL  


 


Neutrophils (%) (Auto) 58 %  


 


Lymphocytes (%) (Auto) 35 %  


 


Monocytes (%) (Auto) 5 %  


 


Eosinophils (%) (Auto) 2 %  


 


Basophils (%) (Auto) 1 %  


 


Neutrophils # (Auto) 4.0 x10^3uL  


 


Lymphocytes # (Auto) 2.4 x10^3/uL  


 


Monocytes # (Auto) 0.3 x10^3/uL  


 


Eosinophils # (Auto) 0.1 x10^3/uL  


 


Basophils # (Auto) 0.0 x10^3/uL  


 


D-Dimer (Camila) 0.35 mg/L  


 


Sodium Level 136 mmol/L  


 


Potassium Level 4.3 mmol/L  


 


Chloride Level 102 mmol/L  


 


Carbon Dioxide Level 28 mmol/L  


 


Anion Gap 6  


 


Blood Urea Nitrogen 14 mg/dL  


 


Creatinine 0.9 mg/dL  


 


Estimated GFR


(Cockcroft-Gault) 76.3 


 





 


BUN/Creatinine Ratio 16  


 


Glucose Level 79 mg/dL  


 


Calcium Level 8.3 mg/dL  


 


Total Bilirubin 0.1 mg/dL  


 


Aspartate Amino Transf


(AST/SGOT) 12 U/L 


 





 


Alanine Aminotransferase


(ALT/SGPT) 20 U/L 


 





 


Alkaline Phosphatase 66 U/L  


 


Troponin I Quantitative < 0.017 ng/mL  


 


Total Protein 7.1 g/dL  


 


Albumin 3.9 g/dL  


 


Albumin/Globulin Ratio 1.2  


 


Urine Opiates Screen Neg  


 


Urine Methadone Screen Neg  


 


Urine Barbiturates Neg  


 


Urine Phencyclidine Screen Neg  


 


Urine


Amphetamine/Methamphetamine Neg 


 





 


Urine Benzodiazepines Screen Neg  


 


Urine Cocaine Screen Neg  


 


Urine Cannabinoids Screen Pos  


 


Urine Ethyl Alcohol Neg  


 


Bedside Urine HCG, Qualitative  hcg negative 








Current Medications








 Medications


  (Trade)  Dose


 Ordered  Sig/Kathleen


 Route


 PRN Reason  Start Time


 Stop Time Status Last Admin


Dose Admin


 


 Ketorolac


 Tromethamine


  (Toradol 30mg


 Vial)  30 mg  1X  ONCE


 IVP


   1/7/21 10:30


 1/7/21 10:31 DC 1/7/21 10:27














EKG


EKG


EKG ordered and interpreted by myself at 0908 hrs. as sinus rhythm at 109 bpm, 

unremarkable intervals, no axis deviation, no ischemic findings present, no 

STEMI





Radiology/Procedures


Radiology/Procedures





EXAM: Chest, single view.





HISTORY: Chest pain.





COMPARISON: 7/23/2020





FINDINGS: A frontal view of the chest is obtained. There is no infiltrate, 

pleural effusion or pneumothorax. The heart is normal in size.





IMPRESSION: No acute pulmonary finding.





Electronically signed by: Harika Whitaker MD (1/7/2021 9:22 AM) [id number]





Heart Score


HEART Score for Chest Pain:  








HEART Score for Chest Pain Response (Comments) Value


 


History Slighlty/Non-Suspicious 0


 


ECG Normal 0


 


Age < 45 0


 


Risk Factors                            1 or 2 Risk Factors 1


 


Troponin < Normal Limit 0


 


Total  1








Risk Factors:


Risk Factors:  DM, Current or recent (<one month) smoker, HTN, HLP, family 

history of CAD, obesity.


Risk Scores:


Risk Factors:  DM, Current or recent (<one month) smoker, HTN, HLP, family 

history of CAD, obesity.





Course & Med Decision Making


Course & Med Decision Making


Discussed with the patient all findings and diagnostic testing. I discussed most

likely diagnosis of noncardiac chest pain versus atypical chest pain.  I 

discussed utility of heart score, she has no significant family medical history 

or other abnormalities indicating need for further diagnostic work-up in ER 

setting nor admission. I stressed need for close outpatient follow-up to review 

today's ER visit. Strict return precautions were also discussed at length with 

good understanding by patient. Patient voiced understanding and agreement with 

the plan. Patient knows to come back for repeat evaluation if concerning signs 

or symptoms present prior to outpatient follow-up. Hemodynamically stable, 

ambulatory and well-appearing at time of disposition.





Dragon Disclaimer


Dragon Disclaimer


This electronic medical record was generated, in whole or in part, using a voice

recognition dictation system.





PERC Rule for PE











PERC Rule for PE Response (Comments) Value


 


Age > 50: No 0


 


HR > 100: Yes (Initially 105 but improved and averaged 70 bpm throughout 

duration of visit.  Measurement was taken while patient was anxious after 

ambulating from ER waiting room to patient room) 1


 


Sa02 on room air <95%: No 0


 


Unilateral leg swelling: No 0


 


Hemoptysis: No 0


 


Recent surgery or trauma: No 0


 


Prior PE or DVT: No 0


 


Hormone use: No 0


 


Total  1











Departure


Departure:


Impression:  


   Primary Impression:  


   Chest pain, unspecified


Disposition:  01 DC HOME SELF CARE/HOMELESS


Condition:  STABLE


Referrals:  


NEREYDA ROB (PCP)


Patient Instructions:  Chest Pain (Nonspecific)





Additional Instructions:  


You were seen for chest pain.  Your workup did not show any acute abnormalities 

today, but does not indicate that you do not have underlying cardiovascular 

disease. You do need to follow up with your primary doctor and/or cardiologist 

for further evaluation and treatment. You should return to the ED if you develop

worsening chest pain, shortness of breath, fever, abnormal sweating, leg 

swelling, or any other new or concerning symptoms.











MINH HUMPHREY DO                  Jan 7, 2021 08:55

## 2021-01-07 NOTE — EKG
[facility] [address], [place] [address]

Test Date:    2021               Test Time:    09:00:37

Pat Name:     OLY HAMPTON          Department:   

Patient ID:   [id number]           Room:          

Gender:       F                        Technician:   FRANCO

:          1995               Requested By: MINH HUMPHREY

Order Number: [id number]            Reading MD:     

                                 Measurements

Intervals                              Axis          

Rate:         109                      P:            58

MO:           136                      QRS:          75

QRSD:         68                       T:            56

QT:           320                                    

QTc:          432                                    

                           Interpretive Statements

SINUS TACHYCARDIA

OTHERWISE NORMAL ECG

RI6.02

No previous ECG available for comparison

## 2021-02-09 ENCOUNTER — HOSPITAL ENCOUNTER (OUTPATIENT)
Dept: HOSPITAL 63 - LAB | Age: 26
End: 2021-02-09
Payer: COMMERCIAL

## 2021-02-09 DIAGNOSIS — K82.8: ICD-10-CM

## 2021-02-09 DIAGNOSIS — Z20.822: ICD-10-CM

## 2021-02-09 DIAGNOSIS — Z01.812: Primary | ICD-10-CM

## 2021-02-09 PROCEDURE — U0003 INFECTIOUS AGENT DETECTION BY NUCLEIC ACID (DNA OR RNA); SEVERE ACUTE RESPIRATORY SYNDROME CORONAVIRUS 2 (SARS-COV-2) (CORONAVIRUS DISEASE [COVID-19]), AMPLIFIED PROBE TECHNIQUE, MAKING USE OF HIGH THROUGHPUT TECHNOLOGIES AS DESCRIBED BY CMS-2020-01-R: HCPCS

## 2021-02-12 ENCOUNTER — HOSPITAL ENCOUNTER (OUTPATIENT)
Dept: HOSPITAL 61 - SURG | Age: 26
Discharge: HOME | End: 2021-02-12
Attending: SURGERY
Payer: COMMERCIAL

## 2021-02-12 VITALS — WEIGHT: 148.81 LBS | BODY MASS INDEX: 23.36 KG/M2 | HEIGHT: 67 IN

## 2021-02-12 VITALS — DIASTOLIC BLOOD PRESSURE: 60 MMHG | SYSTOLIC BLOOD PRESSURE: 98 MMHG

## 2021-02-12 DIAGNOSIS — K21.9: ICD-10-CM

## 2021-02-12 DIAGNOSIS — Z87.891: ICD-10-CM

## 2021-02-12 DIAGNOSIS — Z98.890: ICD-10-CM

## 2021-02-12 DIAGNOSIS — F32.9: ICD-10-CM

## 2021-02-12 DIAGNOSIS — Z79.899: ICD-10-CM

## 2021-02-12 DIAGNOSIS — Z88.8: ICD-10-CM

## 2021-02-12 DIAGNOSIS — K82.8: Primary | ICD-10-CM

## 2021-02-12 DIAGNOSIS — Z88.1: ICD-10-CM

## 2021-02-12 DIAGNOSIS — F41.9: ICD-10-CM

## 2021-02-12 PROCEDURE — 47562 LAPAROSCOPIC CHOLECYSTECTOMY: CPT

## 2021-02-12 PROCEDURE — 81025 URINE PREGNANCY TEST: CPT

## 2021-02-12 RX ADMIN — FENTANYL CITRATE PRN MCG: 50 INJECTION INTRAMUSCULAR; INTRAVENOUS at 09:14

## 2021-02-12 RX ADMIN — FENTANYL CITRATE PRN MCG: 50 INJECTION INTRAMUSCULAR; INTRAVENOUS at 09:29

## 2021-02-12 NOTE — DISCH
DISCHARGE INSTRUCTIONS


Condition on Discharge


Condition on Discharge:  Stable





Activity After Discharge


Activity Instructions for Disc:  Avoid exertion


Other activity instructions:  No lifting more than 20 pounds for 2 weeks





Diet after Discharge


Diet after Discharge:  Low Fat





Wound Incision Care


Other wound/incision instructi:  May shower in 24-hour





Contacting the  after DC


Call your doctor for:  If your condition worsens





Follow-Up


Follow up with:  Dr. Maguire in 2 weeks











SAMSON MAGUIRE MD             Feb 12, 2021 08:58

## 2021-02-12 NOTE — PDOC4
Operative Note


Operative Note


Date: February 12 of 2021 at 854


Preoperative diagnosis: Biliary dyskinesia


Postoperative diagnosis: Same


Procedure: Laparoscopic cholecystectomy


Surgeon: Mendel


Specimen: Gallbladder


Dictation: Patient is 25-year-old female with right upper quadrant abdominal 

pain.  Ultrasound was negative HIDA scan did reproduce pain.  Procedure of 

laparoscopic cholecystectomy was explained to the patient detail risk benefits 

were also discussed including bleeding infection injury to intra-abdominal 

contents possible necessitating further open operations alternatives to this 

procedure also discussed with the patient who seemed to understand and gave both

verbal and written consent to have the procedure performed.  Patient was taken 

to the operating room placed in the supine position general anesthesia was 

initiated once patient was sleeping intubated her abdomen was prepped and draped

in usual sterile fashion using ChloraPrep.  Area just below the umbilicus was i

njected with quarter percent Marcaine with epinephrine incision was made 11 

blade scalpel and a varies needle was placed within the abdomen creating 

pneumoperitoneum once this was complete a 11 mm port was placed and a 5 mm 

camera is placed within the abdomen which was inspected no other ab maladies 

were noted.  A 5 mm port was placed in the epigastrium a 5 mm port was placed in

the right midabdomen and a 5 mm port was placed in the right lateral abdomen.  

The dome of the gallbladder is grasped retracted cephalad the infundibulum of 

the gallbladder is grasped retracted laterally exposing the triangle adherent 

tissues of the triangle were taken down exposing the cystic duct and cystic 

artery both were doubly clipped and transected the gallbladder was taken off the

liver with hook electrocautery placed in Endo Catch bag removed and the 

umbilicus the right upper quadrant is irrigated and suctioned dry hemostasis 

deemed to be appropriate the pneumoperitoneum was reduced all ports were removed

the fascial defect at the umbilicus was closed with a figure-of-eight 0 Vicryl 

suture and the skin was reapproximated all port sites for subcuticular Monocryl 

Mastisol Steri-Strips and island dressings were applied.  Patient was awakened 

and extubated in the operating room taken to recovery in stable condition all 

sponge instrument needle counts listed as correct estimated blood loss 10 mL











SAMSON MAGUIRE MD             Feb 12, 2021 08:56

## 2021-02-16 NOTE — PATHOLOGY
[facility] Accession Number: [id number]

.                                                                01

Material submitted:                                        .

gallbladder - GALLBLADDER AND CONTENTS

.                                                                01

Clinical history:                                          .

BILIARY DYSKINESIA

.                                                                02

**********************************************************************

Diagnosis:

Gallbladder, laparoscopic cholecystectomy:

- Cholesterolosis, focal.

- Chronic cholecystitis.

(JPM:pit 02/16/2021)

[facility]  02/16/2021  0940 Local

**********************************************************************

.                                                                02

Comment:

There are no calculi identified within the gallbladder lumen or specimen

container.  There is no evidence of malignancy.

(JPM:pit 02/16/2021)

.                                                                02

Electronically signed:                                     .

Fabio Raines MD, Pathologist

NPI- [id number]

.                                                                01

Gross description:                                         .

Received in formalin labeled "Juarez, Angeline, gallbladder and contents"

is an intact cholecystectomy specimen measuring 5.8 x 3.5 x 3.2 cm. The

serosa is tan-green and smooth and the specimen is opened to reveal dark

green velvety mucosa without polyps or masses. The average wall thickness

is 0.1 cm.  Calculi are not present.  Representative sections of the

fundus and body and the cystic duct margin are submitted in A1. ([facility];

2/13/2021)

[facility]  02/13/2021  0903 Local

.                                                                02

Pathologist provided ICD-10:

K81.1, K82.4

.                                                                02

CPT                                                        .

[id number]

Specimen Comment: A courtesy copy of this report has been sent to [phone]

Specimen Comment: Report sent to 

***Performed at:  01

   [facility]

   [address], [place], [place]  [id number]

   MD Steven Ayala MD Phone:  [phone]

***Performed at:  02

   [facility]

   [address], [place]  [id number]

   MD Fabio Raines MD Phone:  [phone]

## 2021-04-14 ENCOUNTER — HOSPITAL ENCOUNTER (EMERGENCY)
Dept: HOSPITAL 63 - ER | Age: 26
Discharge: HOME | End: 2021-04-14
Payer: COMMERCIAL

## 2021-04-14 VITALS
WEIGHT: 151.24 LBS | DIASTOLIC BLOOD PRESSURE: 77 MMHG | HEIGHT: 67 IN | SYSTOLIC BLOOD PRESSURE: 128 MMHG | BODY MASS INDEX: 23.74 KG/M2 | DIASTOLIC BLOOD PRESSURE: 77 MMHG

## 2021-04-14 DIAGNOSIS — Z88.5: ICD-10-CM

## 2021-04-14 DIAGNOSIS — F17.210: ICD-10-CM

## 2021-04-14 DIAGNOSIS — Z88.1: ICD-10-CM

## 2021-04-14 DIAGNOSIS — F41.9: ICD-10-CM

## 2021-04-14 DIAGNOSIS — R21: Primary | ICD-10-CM

## 2021-04-14 PROCEDURE — 99283 EMERGENCY DEPT VISIT LOW MDM: CPT

## 2021-04-14 NOTE — PHYS DOC
Past History


Past Medical History:  Anxiety


Past Surgical History:  No Surgical History


Additional Past Surgical Histo:  left elbow


Smoking:  Cigarettes, Less than 1pk/day


Alcohol Use:  None


Drug Use:  Cocaine, Marijuana, Methamphetamine





General Adult


EDM:


Chief Complaint:  SKIN RASH/ABSCESS





HPI:


HPI:


25-year-old female presents with facial rash and concern for allergic reaction. 

She started using a new vape oil yesterday.  It did not occur during told today 

that it was blueberry flavored and its possible that he may have some kind of 

strawberry extract in it.  She feels like she has got an itching sensation and 

mild bumps around her mouth.  This seemed to spread to the rest of her face this

morning.  She took 1 Benadryl and admits that it seems a bit better.  She has 

had no difficulty breathing or shortness of breath.  She denies any oral 

swelling.  She has no other complaints at this time.





Review of Systems:


Review of Systems:


Constitutional:  Denies fever or chills 


Eyes:  Denies change in visual acuity 


HENT:  Denies nasal congestion or sore throat 


Respiratory:  Denies cough or shortness of breath 


Cardiovascular:  Denies chest pain or edema 


GI:  Denies abdominal pain, nausea, vomiting, bloody stools or diarrhea 


: Denies dysuria 


Musculoskeletal:  Denies back pain or joint pain 


Integument: Rash


Neurologic:  Denies headache, focal weakness or sensory changes 


Endocrine:  Denies polyuria or polydipsia 


Lymphatic:  Denies swollen glands 


Psychiatric:  Denies depression or anxiety





Allergies:


Allergies:





Allergies








Coded Allergies Type Severity Reaction Last Updated Verified


 


  hydrocodone Allergy Unknown  7/23/20 Yes


 


  amoxicillin Adverse Reaction Intermediate  7/23/20 Yes


 


  cefaclor Adverse Reaction Intermediate  7/23/20 Yes











Physical Exam:


PE:





Constitutional: Well developed, well nourished, no acute distress, non-toxic 

appearance. []


HENT: Normocephalic, atraumatic, bilateral external ears normal, oropharynx 

moist with no signs of swelling, no oral exudates, nose normal. []


Eyes: PERRLA, EOMI, conjunctiva normal, no discharge. [] 


Neck: Normal range of motion, no tenderness, supple, no stridor. [] 


Cardiovascular:Heart rate regular rhythm, no murmur []


Lungs & Thorax:  Bilateral breath sounds clear to auscultation []


Abdomen: Bowel sounds normal, soft, no tenderness, no masses, no pulsatile 

masses. [] 


Skin: Mild scattered papules 1 mm around the mouth and on the forehead.  No 

erythema or swelling.  [] 


Back: No tenderness, no CVA tenderness. [] 


Extremities: No tenderness, no cyanosis, no clubbing, ROM intact, no edema. [] 


Neurologic: Alert and oriented X 3, normal motor function, normal sensory 

function, no focal deficits noted. []


Psychologic: Affect normal, judgement normal, mood normal. []





EKG:


EKG:


[]





Radiology/Procedures:


Radiology/Procedures:


[]





Heart Score:


C/O Chest Pain:  N/A


Risk Factors:


Risk Factors:  DM, Current or recent (<one month) smoker, HTN, HLP, family 

history of CAD, obesity.


Risk Scores:


Score 0 - 3:  2.5% MACE over next 6 weeks - Discharge Home


Score 4 - 6:  20.3% MACE over next 6 weeks - Admit for Clinical Observation


Score 7 - 10:  72.7% MACE over next 6 weeks - Early Invasive Strategies





Course & Med Decision Making:


Course & Med Decision Making


Pertinent Labs and Imaging studies reviewed. (See chart for details)


The patient's exam is very unimpressive.  It is possible that she is having a 

mild reaction.  I will give her 50 mg of prednisone.  I do not believe she needs

 a taper for home.  She can continue Benadryl as needed.  If she develops any 

difficulty breathing or worse symptoms, she will return to the emergency room.  

She is stable for discharge at this time.


[]





Dragon Disclaimer:


Dragon Disclaimer:


This electronic medical record was generated, in whole or in part, using a voice

 recognition dictation system.





Departure


Departure:


Impression:  


   Primary Impression:  


   Rash of face


Disposition:  01 HOME / SELF CARE / HOMELESS


Condition:  STABLE


Referrals:  


NEREYDA ROB (PCP)


Patient Instructions:  Rash, Easy-to-Read











ALEXANDRA CARLOS DO                 Apr 14, 2021 19:45

## 2021-04-19 ENCOUNTER — HOSPITAL ENCOUNTER (OUTPATIENT)
Dept: HOSPITAL 63 - CT | Age: 26
End: 2021-04-19
Attending: PHYSICIAN ASSISTANT
Payer: COMMERCIAL

## 2021-04-19 ENCOUNTER — HOSPITAL ENCOUNTER (EMERGENCY)
Dept: HOSPITAL 63 - ER | Age: 26
Discharge: HOME | End: 2021-04-19
Payer: COMMERCIAL

## 2021-04-19 VITALS
DIASTOLIC BLOOD PRESSURE: 77 MMHG | SYSTOLIC BLOOD PRESSURE: 123 MMHG | SYSTOLIC BLOOD PRESSURE: 123 MMHG | DIASTOLIC BLOOD PRESSURE: 77 MMHG

## 2021-04-19 VITALS — WEIGHT: 151.24 LBS | BODY MASS INDEX: 23.74 KG/M2 | HEIGHT: 67 IN

## 2021-04-19 DIAGNOSIS — J98.4: Primary | ICD-10-CM

## 2021-04-19 DIAGNOSIS — R07.9: ICD-10-CM

## 2021-04-19 DIAGNOSIS — Z90.49: ICD-10-CM

## 2021-04-19 DIAGNOSIS — F41.9: ICD-10-CM

## 2021-04-19 DIAGNOSIS — G43.809: Primary | ICD-10-CM

## 2021-04-19 DIAGNOSIS — F17.210: ICD-10-CM

## 2021-04-19 DIAGNOSIS — Z88.5: ICD-10-CM

## 2021-04-19 DIAGNOSIS — Z88.1: ICD-10-CM

## 2021-04-19 DIAGNOSIS — Z88.8: ICD-10-CM

## 2021-04-19 LAB
ALBUMIN SERPL-MCNC: 4.3 G/DL (ref 3.4–5)
ALBUMIN/GLOB SERPL: 1.2 {RATIO} (ref 1–1.7)
ALP SERPL-CCNC: 53 U/L (ref 46–116)
ALT SERPL-CCNC: 22 U/L (ref 14–59)
AMPHETAMINE/METHAMPHETAMINE: (no result)
ANION GAP SERPL CALC-SCNC: 8 MMOL/L (ref 6–14)
APTT PPP: YELLOW S
AST SERPL-CCNC: 14 U/L (ref 15–37)
BACTERIA #/AREA URNS HPF: 0 /HPF
BARBITURATES UR-MCNC: (no result) UG/ML
BASOPHILS # BLD AUTO: 0 X10^3/UL (ref 0–0.2)
BASOPHILS NFR BLD: 0 % (ref 0–3)
BENZODIAZ UR-MCNC: (no result) UG/L
BILIRUB SERPL-MCNC: 0.4 MG/DL (ref 0.2–1)
BILIRUB UR QL STRIP: (no result)
BUN/CREAT SERPL: 12 (ref 6–20)
CA-I SERPL ISE-MCNC: 11 MG/DL (ref 7–20)
CALCIUM SERPL-MCNC: 8.7 MG/DL (ref 8.5–10.1)
CANNABINOIDS UR-MCNC: (no result) UG/L
CHLORIDE SERPL-SCNC: 105 MMOL/L (ref 98–107)
CO2 SERPL-SCNC: 26 MMOL/L (ref 21–32)
COCAINE UR-MCNC: (no result) NG/ML
CREAT SERPL-MCNC: 0.9 MG/DL (ref 0.6–1)
EOSINOPHIL NFR BLD: 0.1 X10^3/UL (ref 0–0.7)
EOSINOPHIL NFR BLD: 1 % (ref 0–3)
ERYTHROCYTE [DISTWIDTH] IN BLOOD BY AUTOMATED COUNT: 14.2 % (ref 11.5–14.5)
FIBRINOGEN PPP-MCNC: (no result) MG/DL
GFR SERPLBLD BASED ON 1.73 SQ M-ARVRAT: 76.3 ML/MIN
GLOBULIN SER-MCNC: 3.7 G/DL (ref 2.2–3.8)
GLUCOSE SERPL-MCNC: 107 MG/DL (ref 70–99)
GLUCOSE UR STRIP-MCNC: (no result) MG/DL
HCT VFR BLD CALC: 38.7 % (ref 36–47)
HGB BLD-MCNC: 13 G/DL (ref 12–15.5)
LYMPHOCYTES # BLD: 2.9 X10^3/UL (ref 1–4.8)
LYMPHOCYTES NFR BLD AUTO: 41 % (ref 24–48)
MCH RBC QN AUTO: 31 PG (ref 25–35)
MCHC RBC AUTO-ENTMCNC: 34 G/DL (ref 31–37)
MCV RBC AUTO: 94 FL (ref 79–100)
METHADONE SERPL-MCNC: (no result) NG/ML
MONO #: 0.3 X10^3/UL (ref 0–1.1)
MONOCYTES NFR BLD: 5 % (ref 0–9)
NEUT #: 3.7 X10^3UL (ref 1.8–7.7)
NEUTROPHILS NFR BLD AUTO: 53 % (ref 31–73)
NITRITE UR QL STRIP: (no result)
OPIATES UR-MCNC: (no result) NG/ML
PCP SERPL-MCNC: (no result) MG/DL
PLATELET # BLD AUTO: 229 X10^3/UL (ref 140–400)
POTASSIUM SERPL-SCNC: 3.8 MMOL/L (ref 3.5–5.1)
PROT SERPL-MCNC: 8 G/DL (ref 6.4–8.2)
RBC # BLD AUTO: 4.14 X10^6/UL (ref 3.5–5.4)
RBC #/AREA URNS HPF: (no result) /HPF (ref 0–2)
SODIUM SERPL-SCNC: 139 MMOL/L (ref 136–145)
SP GR UR STRIP: 1.02
SQUAMOUS #/AREA URNS LPF: (no result) /LPF
UROBILINOGEN UR-MCNC: 0.2 MG/DL
WBC # BLD AUTO: 7 X10^3/UL (ref 4–11)
WBC #/AREA URNS HPF: (no result) /HPF (ref 0–4)

## 2021-04-19 PROCEDURE — 87086 URINE CULTURE/COLONY COUNT: CPT

## 2021-04-19 PROCEDURE — 81025 URINE PREGNANCY TEST: CPT

## 2021-04-19 PROCEDURE — 80307 DRUG TEST PRSMV CHEM ANLYZR: CPT

## 2021-04-19 PROCEDURE — 81001 URINALYSIS AUTO W/SCOPE: CPT

## 2021-04-19 PROCEDURE — 96360 HYDRATION IV INFUSION INIT: CPT

## 2021-04-19 PROCEDURE — 85025 COMPLETE CBC W/AUTO DIFF WBC: CPT

## 2021-04-19 PROCEDURE — 36415 COLL VENOUS BLD VENIPUNCTURE: CPT

## 2021-04-19 PROCEDURE — 71250 CT THORAX DX C-: CPT

## 2021-04-19 PROCEDURE — 80053 COMPREHEN METABOLIC PANEL: CPT

## 2021-04-19 PROCEDURE — 99285 EMERGENCY DEPT VISIT HI MDM: CPT

## 2021-04-19 PROCEDURE — 70470 CT HEAD/BRAIN W/O & W/DYE: CPT

## 2021-04-19 NOTE — RAD
EXAM: Chest CT without intravenous contrast.



HISTORY: Chest pain. Shortness of breath with exertion.



TECHNIQUE: Computed tomographic images of the chest were obtained without contrast. Multiplanar refor
matting was performed.



*One or more of the following individualized dose reduction techniques were utilized for this examina
tion:  

1. Automated exposure control.  

2. Adjustment of the mA and/or kV according to patient size.  

3. Use of iterative reconstruction technique.



COMPARISON: None.



FINDINGS: The heart is normal in size. The aorta is normal in caliber. There is soft tissue within th
e anterior mediastinum due to residual thymus, within acceptable limits for patient age. There is no 
lymphadenopathy. There is no pneumothorax or pleural effusion. There is a calcified granuloma within 
the right middle lobe. There is no infiltrate or suspicious pulmonary nodule. The gallbladder is surg
ically absent. There is no acute finding involving the upper abdomen. There is no suspicious osseous 
lesion.



IMPRESSION: No acute thoracic finding.



Electronically signed by: Harika Whitaker MD (4/19/2021 8:39 AM) [id number]

## 2021-04-19 NOTE — PHYS DOC
Past History


Past Medical History:  Anxiety


Past Surgical History:  No Surgical History


Additional Past Surgical Histo:  left elbow


Smoking:  Cigarettes, Less than 1pk/day


Alcohol Use:  None


Drug Use:  Cocaine, Marijuana, Methamphetamine





General Adult


EDM:


Chief Complaint:  EYE PROBLEMS





HPI:


HPI:


25-year-old female presents with visual change episodes.  The patient has had at

least 3 episodes in the last 1 month where she starts to get profuse eye 

watering, blurry vision and then has a total blackout of her vision.  She does 

not pass out.  The blank vision lasts between 30 seconds and 2 minutes and then 

her vision returns.  She believes she has a mild headache afterwards, but the 

patient has frequent headaches at baseline.  No history of migraines.  She 

denies any recent trauma but admits to concussion in the past when she was 

younger.  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  She

pulled over and was very concerned when she lost vision for a while.  She denies

nausea, vomiting, slurred speech, weakness of extremities.  She denies having 

increased stress before or after these episodes.  She is not taking any 

medications or supplements.  She is on Depo-Provera for birth control.  She 

denies alcohol use.  She smokes cigarettes and marijuana daily.  Denies fever or

chills.





Review of Systems:


Review of Systems:


Constitutional:  Denies fever or chills 


Eyes:  Denies change in visual acuity.  Visual blackouts.


HENT:  Denies nasal congestion or sore throat 


Respiratory:  Denies cough or shortness of breath 


Cardiovascular:  Denies chest pain or edema 


GI:  Denies abdominal pain, nausea, vomiting, bloody stools or diarrhea 


: Denies dysuria 


Musculoskeletal:  Denies back pain or joint pain 


Integument:  Denies rash 


Neurologic: Frequent headaches.  Denies focal weakness or sensory changes 


Endocrine:  Denies polyuria or polydipsia 


Lymphatic:  Denies swollen glands 


Psychiatric:  Denies depression or anxiety





Allergies:


Allergies:





Allergies








Coded Allergies Type Severity Reaction Last Updated Verified


 


  hydrocodone Allergy Unknown  4/19/21 Yes


 


  amoxicillin Adverse Reaction Intermediate  4/19/21 Yes


 


  cefaclor Adverse Reaction Intermediate  4/19/21 Yes











Physical Exam:


PE:





Constitutional: Well developed, well nourished, no acute distress, non-toxic 

appearance. []


HENT: Normocephalic, atraumatic, bilateral external ears normal, oropharynx 

moist, no oral exudates, nose normal. []


Eyes: PERRLA, EOMI, conjunctiva normal, no discharge. [] 


Neck: Normal range of motion, no tenderness, supple, no stridor. [] 


Cardiovascular: Heart rate regular rhythm, no murmur []


Lungs & Thorax:  Bilateral breath sounds clear to auscultation []


Abdomen: Bowel sounds normal, soft, no tenderness, no masses, no pulsatile 

masses. [] 


Skin: Warm, dry, no erythema, no rash. [] 


Back: No tenderness, no CVA tenderness. [] 


Extremities: No tenderness, no cyanosis, no clubbing, ROM intact, no edema. [] 


Neurologic: Alert and oriented X 3, normal motor function, normal sensory 

function, no focal deficits noted. []


Psychologic: Affect normal, judgement normal, mood concerned. []





Current Patient Data:


Vital Signs:





                                   Vital Signs








  Date Time  Temp Pulse Resp B/P (MAP) Pulse Ox O2 Delivery O2 Flow Rate FiO2


 


4/19/21 08:09 97.0 79 18 123/77 (92) 98 Room Air  











EKG:


EKG:


[]





Radiology/Procedures:


Radiology/Procedures:


[]


Impressions:


RS Compliance Statement:





One or more of the following individualized dose reduction techniques were 

utilized for this examination:  


1. Automated exposure control  


2. Adjustment of the mA and/or kV according to patient size  


3. Use of iterative reconstruction technique








CT HEAD WITHOUT AND WITH IV CONTRAST





Clinical Indication: Reason: visual blackouts 1 month / Spl. Instructions:  / 

History:  





Comparison: CT head without contrast September 2, 2015.





Procedure: Axial images are obtained of the head from the skull base through the

 vertex before and after 70 cc of Omnipaque 300 IV contrast. 





Findings: 


The ventricles and sulci are normal for the patient's age. 


No mass-effect, midline shift, hemorrhage, extra-axial fluid collection, or 

obvious acute infarction is identified.  Basilar cisterns are patent. No 

abnormal intracranial postcontrast enhancement is identified.





Bone windows demonstrate no acute calvarial abnormality. Redemonstrated partial 

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 

ring of C1.





The visualized paranasal sinuses are clear. Mastoid air cells are well aerated. 





IMPRESSION:


No acute intracranial abnormality. 





Electronically signed by: Henry Sylvester MD (4/19/2021 9:46 AM) [id number]














DICTATED AND SIGNED BY:     HENRY SYLVESTER MD


DATE:     04/19/21 0938





CC: ALEXANDRA CARLOS DO; NEREYDA ROB ~MTH0 0





Heart Score:


C/O Chest Pain:  N/A


Risk Factors:


Risk Factors:  DM, Current or recent (<one month) smoker, HTN, HLP, family 

history of CAD, obesity.


Risk Scores:


Score 0 - 3:  2.5% MACE over next 6 weeks - Discharge Home


Score 4 - 6:  20.3% MACE over next 6 weeks - Admit for Clinical Observation


Score 7 - 10:  72.7% MACE over next 6 weeks - Early Invasive Strategies





Course & Med Decision Making:


Course & Med Decision Making


Pertinent Labs and Imaging studies reviewed. (See chart for details)


The patient's labs are unremarkable.  She is positive for marijuana.  Her head 

CT with and without contrast does not show any significant findings.  The way 

the patient describes these episodes, they could be visual migraines.  I do not 

see any acutely life-threatening conditions at this time.  I have advised that 

she follow-up with her primary care physician and consider a neurology consult. 

 She is stable for discharge at this time.


[]





Dragon Disclaimer:


Dragon Disclaimer:


This electronic medical record was generated, in whole or in part, using a voice

 recognition dictation system.





Departure


Departure:


Impression:  


   Primary Impression:  


   Migraine variant


   Additional Impression:  


   Visual disturbance


Disposition:  01 HOME / SELF CARE / HOMELESS


Condition:  STABLE


Referrals:  


NEREYDA ROB (PCP)


Patient Instructions:  Migraine Headache, Easy-to-Read











ALEXANDRA CARLOS DO                 Apr 19, 2021 08:24

## 2021-04-19 NOTE — RAD
RS Compliance Statement:



One or more of the following individualized dose reduction techniques were utilized for this examinat
ion:  

1. Automated exposure control  

2. Adjustment of the mA and/or kV according to patient size  

3. Use of iterative reconstruction technique





CT HEAD WITHOUT AND WITH IV CONTRAST



Clinical Indication: Reason: visual blackouts 1 month / Spl. Instructions:  / History:  



Comparison: CT head without contrast September 2, 2015.



Procedure: Axial images are obtained of the head from the skull base through the vertex before and af
ter 70 cc of Omnipaque 300 IV contrast. 



Findings: 

The ventricles and sulci are normal for the patient's age. 

No mass-effect, midline shift, hemorrhage, extra-axial fluid collection, or obvious acute infarction 
is identified.  Basilar cisterns are patent. No abnormal intracranial postcontrast enhancement is liat
ntified.



Bone windows demonstrate no acute calvarial abnormality. Redemonstrated partial congenital fusion of 
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



The visualized paranasal sinuses are clear. Mastoid air cells are well aerated. 



IMPRESSION:

No acute intracranial abnormality. 



Electronically signed by: eHnry Hawk MD (4/19/2021 9:46 AM) [id number]

## 2021-06-10 ENCOUNTER — HOSPITAL ENCOUNTER (OUTPATIENT)
Dept: HOSPITAL 63 - US | Age: 26
End: 2021-06-10
Payer: COMMERCIAL

## 2021-06-10 DIAGNOSIS — R10.11: Primary | ICD-10-CM

## 2021-06-10 DIAGNOSIS — Z80.49: ICD-10-CM

## 2021-06-10 PROCEDURE — 76705 ECHO EXAM OF ABDOMEN: CPT

## 2021-06-10 NOTE — RAD
EXAM: ULTRASOUND ABDOMEN LIMITED



CLINICAL HISTORY: Pain status post cholecystomy.



COMPARISON: CT dated 11/8/2018.



TECHNIQUE: Limited ultrasound examination of the right upper quadrant of the abdomen was performed.



FINDINGS: The liver is normal in size. No focal hepatic lesion is seen. The gallbladder is surgically
 absent. The common bile duct is normal in caliber. The pancreas, right kidney and visualized portion
s of the aorta are unremarkable. The inferior vena cava is prominent in caliber, stable compared to t
he prior CT when allowing for differences in technique. There is no free fluid.



IMPRESSION: No acute sonographic finding. Cholecystectomy.



Electronically signed by: Harika Whitaker MD (6/10/2021 9:20 AM) [id number]

## 2021-07-04 ENCOUNTER — HOSPITAL ENCOUNTER (EMERGENCY)
Dept: HOSPITAL 63 - ER | Age: 26
Discharge: HOME | End: 2021-07-04
Payer: COMMERCIAL

## 2021-07-04 VITALS
DIASTOLIC BLOOD PRESSURE: 70 MMHG | DIASTOLIC BLOOD PRESSURE: 70 MMHG | SYSTOLIC BLOOD PRESSURE: 107 MMHG | SYSTOLIC BLOOD PRESSURE: 107 MMHG

## 2021-07-04 VITALS — BODY MASS INDEX: 23.56 KG/M2 | WEIGHT: 150.13 LBS | HEIGHT: 67 IN

## 2021-07-04 DIAGNOSIS — Z90.49: ICD-10-CM
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
## 2021-07-04 DIAGNOSIS — F15.10: ICD-10-CM

## 2021-07-04 DIAGNOSIS — F12.10: ICD-10-CM

## 2021-07-04 DIAGNOSIS — F17.210: ICD-10-CM

## 2021-07-04 DIAGNOSIS — T78.40XA: Primary | ICD-10-CM

## 2021-07-04 DIAGNOSIS — F41.9: ICD-10-CM

## 2021-07-04 DIAGNOSIS — Z88.1: ICD-10-CM

## 2021-07-04 PROCEDURE — 99283 EMERGENCY DEPT VISIT LOW MDM: CPT

## 2021-07-04 NOTE — PHYS DOC
Past History


Past Medical History:  Anxiety


Past Surgical History:  Cholecystectomy


Additional Past Surgical Histo:  left elbow


Smoking:  Cigarettes, Less than 1pk/day


Alcohol Use:  None


Drug Use:  Cocaine, Marijuana, Methamphetamine





General Adult


EDM:


Chief Complaint:  ALLERGIC REACTION





HPI:


HPI:


25-year-old female presents with concern for allergic reaction.  She was 

recently placed on cefdinir for a sinus infection and ear infection.  She has 

only taken 3 doses.  She feels like she is having tingling around her mouth.  

She also feels like her throat feels slightly swollen.  Denies rash or pruritus.

 She denies having difficulty breathing.  It is slightly more prominent to 

swallow.  She has an amoxicillin and cephalosporin allergy but it was many years

ago.  She just had a rash at that time.  She has no other complaints.





Review of Systems:


Review of Systems:


Constitutional:  Denies fever or chills 


Eyes:  Denies change in visual acuity 


HENT: Mild discomfort with swallowing


Respiratory:  Denies cough or shortness of breath 


Cardiovascular:  Denies chest pain or edema 


GI:  Denies abdominal pain, nausea, vomiting, bloody stools or diarrhea 


: Denies dysuria 


Musculoskeletal:  Denies back pain or joint pain 


Integument:  Denies rash 


Neurologic:  Denies headache, focal weakness or sensory changes 


Endocrine:  Denies polyuria or polydipsia 


Lymphatic:  Denies swollen glands 


Psychiatric:  Denies depression or anxiety





Allergies:


Allergies:





Allergies








Coded Allergies Type Severity Reaction Last Updated Verified


 


  hydrocodone Allergy Unknown  4/19/21 Yes


 


  amoxicillin Adverse Reaction Intermediate  4/19/21 Yes


 


  cefaclor Adverse Reaction Intermediate  4/19/21 Yes











Physical Exam:


PE:





Constitutional: Well developed, well nourished, no acute distress, non-toxic 

appearance. []


HENT: Normocephalic, atraumatic, bilateral external ears normal, oropharynx 

moist, no oral exudates, nose normal. []


Eyes: PERRLA, EOMI, conjunctiva normal, no discharge. [] 


Neck: Normal range of motion, no tenderness, supple, no stridor. [] 


Cardiovascular: Heart rate regular rhythm, no murmur []


Lungs & Thorax:  Bilateral breath sounds clear to auscultation []


Abdomen: Bowel sounds normal, soft, no tenderness, no masses, no pulsatile 

masses. [] 


Skin: Warm, dry, no erythema, no rash. [] 


Back: No tenderness, no CVA tenderness. [] 


Extremities: No tenderness, no cyanosis, no clubbing, ROM intact, no edema. [] 


Neurologic: Alert and oriented X 3, normal motor function, normal sensory 

function, no focal deficits noted. []


Psychologic: Affect normal, judgement normal, mood normal. []





Current Patient Data:


Vital Signs:





                                   Vital Signs








  Date Time  Temp Pulse Resp B/P (MAP) Pulse Ox O2 Delivery O2 Flow Rate FiO2


 


7/4/21 11:12 97.9 77 14 109/78 100 Room Air  











EKG:


EKG:


[]





Radiology/Procedures:


Radiology/Procedures:


[]





Heart Score:


C/O Chest Pain:  N/A


Risk Factors:


Risk Factors:  DM, Current or recent (<one month) smoker, HTN, HLP, family 

history of CAD, obesity.


Risk Scores:


Score 0 - 3:  2.5% MACE over next 6 weeks - Discharge Home


Score 4 - 6:  20.3% MACE over next 6 weeks - Admit for Clinical Observation


Score 7 - 10:  72.7% MACE over next 6 weeks - Early Invasive Strategies





Course & Med Decision Making:


Course & Med Decision Making


Pertinent Labs and Imaging studies reviewed. (See chart for details)


My exam did not show any significant swelling of the lips, face, or oropharynx. 

 I will give the patient 60 mg of prednisone in the ED.  She took 25 mg of 

Benadryl just prior to coming to the hospital.  The patient has had no further 

increase in symptoms.  At no time did she have any difficulty breathing.  I will

 switch her cefdinir to doxycycline out of abundance of caution.  She is stable 

for discharge at this time.


[]





Dragon Disclaimer:


Dragon Disclaimer:


This electronic medical record was generated, in whole or in part, using a voice

 recognition dictation system.





Departure


Departure:


Impression:  


   Primary Impression:  


   Allergic reaction caused by a drug


   Qualified Codes:  T78.40XA - Allergy, unspecified, initial encounter


Disposition:  01 HOME / SELF CARE / HOMELESS


Condition:  STABLE


Referrals:  


NEREYDA ROB (PCP)


Patient Instructions:  Drug Allergy, Easy-to-Read


Scripts


Doxycycline Hyclate (DOXYCYCLINE HYCLATE) 100 Mg Tablet


1 TAB PO BID for sinus infection for 7 Days, #14 TAB


   Prov: ALEXANDRA CARLOS DO         7/4/21











ALEXANDRA CARLOS DO                  Jul 4, 2021 11:54

## 2021-07-14 ENCOUNTER — HOSPITAL ENCOUNTER (OUTPATIENT)
Dept: HOSPITAL 61 - ENDOS | Age: 26
Discharge: HOME | End: 2021-07-14
Attending: INTERNAL MEDICINE
Payer: COMMERCIAL

## 2021-07-14 VITALS — SYSTOLIC BLOOD PRESSURE: 116 MMHG | DIASTOLIC BLOOD PRESSURE: 79 MMHG

## 2021-07-14 VITALS — DIASTOLIC BLOOD PRESSURE: 65 MMHG | SYSTOLIC BLOOD PRESSURE: 111 MMHG

## 2021-07-14 VITALS — HEIGHT: 67 IN | WEIGHT: 142.2 LBS | BODY MASS INDEX: 22.32 KG/M2

## 2021-07-14 DIAGNOSIS — K52.9: Primary | ICD-10-CM

## 2021-07-14 DIAGNOSIS — Z72.89: ICD-10-CM

## 2021-07-14 DIAGNOSIS — K63.89: ICD-10-CM

## 2021-07-14 DIAGNOSIS — Z98.890: ICD-10-CM

## 2021-07-14 DIAGNOSIS — Z90.49: ICD-10-CM

## 2021-07-14 DIAGNOSIS — K31.89: ICD-10-CM

## 2021-07-14 DIAGNOSIS — K21.9: ICD-10-CM

## 2021-07-14 DIAGNOSIS — K64.0: ICD-10-CM

## 2021-07-14 DIAGNOSIS — Z79.899: ICD-10-CM

## 2021-07-14 DIAGNOSIS — Z88.8: ICD-10-CM

## 2021-07-14 DIAGNOSIS — F32.9: ICD-10-CM

## 2021-07-14 DIAGNOSIS — R10.11: ICD-10-CM

## 2021-07-14 DIAGNOSIS — F41.9: ICD-10-CM

## 2021-07-14 DIAGNOSIS — Z87.891: ICD-10-CM

## 2021-07-14 DIAGNOSIS — Z88.1: ICD-10-CM

## 2021-07-14 DIAGNOSIS — Z20.822: ICD-10-CM

## 2021-07-14 PROCEDURE — 81025 URINE PREGNANCY TEST: CPT

## 2021-07-14 PROCEDURE — 87426 SARSCOV CORONAVIRUS AG IA: CPT

## 2021-07-14 PROCEDURE — 43239 EGD BIOPSY SINGLE/MULTIPLE: CPT

## 2021-07-14 PROCEDURE — 45380 COLONOSCOPY AND BIOPSY: CPT

## 2021-08-11 ENCOUNTER — HOSPITAL ENCOUNTER (OUTPATIENT)
Dept: HOSPITAL 63 - RAD | Age: 26
End: 2021-08-11
Attending: FAMILY MEDICINE
Payer: COMMERCIAL
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
## 2021-08-11 DIAGNOSIS — S69.92XA: Primary | ICD-10-CM

## 2021-08-11 DIAGNOSIS — M79.642: ICD-10-CM
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
## 2021-08-11 PROCEDURE — 73120 X-RAY EXAM OF HAND: CPT

## 2021-08-11 NOTE — RAD
XR HAND_LEFT 2 VIEWS 



DATE: 8/11/2021 10:58 AM



INDICATION:  Reason: LEFT HAND PAIN, INJURY TO TIP OF 2ND FINGER. / Spl. Instructions:  / History:   




COMPARISON:  None.



FINDINGS: 



Bones: There is no evidence of acute fracture or dislocation. 



Joints: The joint spaces are normal.  



Miscellaneous: No radiopaque foreign bodies.



IMPRESSION:  



No evidence of acute fracture.



Electronically signed by: Salazar Pardo MD (8/11/2021 12:00 PM) [id number]

## 2021-08-26 ENCOUNTER — HOSPITAL ENCOUNTER (EMERGENCY)
Dept: HOSPITAL 63 - ER | Age: 26
Discharge: HOME | End: 2021-08-26
Payer: COMMERCIAL

## 2021-08-26 VITALS — SYSTOLIC BLOOD PRESSURE: 102 MMHG | DIASTOLIC BLOOD PRESSURE: 73 MMHG

## 2021-08-26 VITALS — BODY MASS INDEX: 23.49 KG/M2 | HEIGHT: 67 IN | WEIGHT: 149.69 LBS

## 2021-08-26 DIAGNOSIS — N39.0: Primary | ICD-10-CM

## 2021-08-26 DIAGNOSIS — F15.10: ICD-10-CM

## 2021-08-26 DIAGNOSIS — F14.10: ICD-10-CM

## 2021-08-26 DIAGNOSIS — Z88.1: ICD-10-CM

## 2021-08-26 DIAGNOSIS — F12.10: ICD-10-CM

## 2021-08-26 DIAGNOSIS — F17.210: ICD-10-CM

## 2021-08-26 DIAGNOSIS — Z90.49: ICD-10-CM

## 2021-08-26 LAB
APTT PPP: (no result) S
BACTERIA #/AREA URNS HPF: (no result) /HPF
BILIRUB UR QL STRIP: (no result)
FIBRINOGEN PPP-MCNC: (no result) MG/DL
GLUCOSE UR STRIP-MCNC: (no result) MG/DL
NITRITE UR QL STRIP: (no result)
RBC #/AREA URNS HPF: (no result) /HPF (ref 0–2)
SP GR UR STRIP: 1.02
SQUAMOUS #/AREA URNS LPF: (no result) /LPF
UROBILINOGEN UR-MCNC: 0.2 MG/DL
WBC #/AREA URNS HPF: (no result) /HPF (ref 0–4)

## 2021-08-26 PROCEDURE — 99283 EMERGENCY DEPT VISIT LOW MDM: CPT

## 2021-08-26 PROCEDURE — 81025 URINE PREGNANCY TEST: CPT

## 2021-08-26 PROCEDURE — 81001 URINALYSIS AUTO W/SCOPE: CPT

## 2021-08-26 NOTE — PHYS DOC
Past History


Past Medical History:  Anxiety


 (DAVIE MCGOVERN)


Past Surgical History:  Cholecystectomy


Additional Past Surgical Histo:  left elbow


 (DAVIE MCGOVERN)


Smoking:  Cigarettes, Less than 1pk/day


Alcohol Use:  None


Drug Use:  Cocaine, Marijuana, Methamphetamine


 (DAVIE MCGOVERN)





Adult General


Chief Complaint


Chief Complaint:  ABDOMINAL PAIN





[facility]


HPI





Patient is a 26-year-old female patient who presents with right sided abdominal 

pain.  Patient states pain seems start this morning, has been fairly constant 

throughout the day, sometimes is a dull pain, sometimes is a sharp pain.  States

she has multiple been nauseous, however states she has not had any emesis.  

Denies any diarrhea.  Denies constipation.  She reports she has had pain like 

this in the past, she does not know when the last time was checked on the 

ligaments her pain better is if she lays flat.  Denies change in urination.  

Does report she has had a history of a prior kidney stone, also has had history 

of prior UTIs.  Denies any fevers.  Denies any urinary frequency, denies any 

vaginal bleeding, vaginal discharge.  States she has not had a menstrual cycle 

since she has gotten off Depo, her last Depo injection was in March 2021


 (DAVIE MCGOVERN)





Review of Systems


Review of Systems





Constitutional: Denies fever or chills []


Eyes: Denies change in visual acuity, redness, or eye pain []


HENT: Denies nasal congestion or sore throat []


Respiratory: Denies cough or shortness of breath []


Cardiovascular: No additional information not addressed in HPI []


GI: Denies vomiting, bloody stools or diarrhea [] states she does have some n

ausea intermittently, states she does have some discomfort to her right abdomen.


: Denies dysuria or hematuria [] denies any vaginal bleeding, vaginal 

discharge


Musculoskeletal: Denies back pain or joint pain []


Integument: Denies rash or skin lesions []


Neurologic: Denies headache, focal weakness or sensory changes []


Endocrine: Denies polyuria or polydipsia []





All other systems were reviewed and found to be within normal limits, except as 

documented in this note.


 (DAVIE MCGOVERN)





Allergies


Allergies





Allergies








Coded Allergies Type Severity Reaction Last Updated Verified


 


  hydrocodone Allergy Unknown  4/19/21 Yes


 


  amoxicillin Adverse Reaction Intermediate  4/19/21 Yes


 


  cefaclor Adverse Reaction Intermediate  4/19/21 Yes








 (DAVIE MCGOVERN)





Physical Exam


Physical Exam





Constitutional: Well developed, well nourished, no acute distress, non-toxic 

appearance. []


HENT: Normocephalic, atraumatic, bilateral external ears normal, oropharynx 

moist, no oral exudates, nose normal. []


Eyes: PERRLA, EOMI, conjunctiva normal, no discharge. [] 


Neck: Normal range of motion, no tenderness, supple, no stridor. [] 


Cardiovascular:Heart rate regular rhythm, no murmur []


Lungs & Thorax:  Bilateral breath sounds clear to auscultation []


Abdomen:  soft, , no masses, no pulsatile masses. [] Minimal tenderness noted to

 right upper quadrant on deep palpation.  Bowel sounds hyper active


Skin: Warm, dry, no erythema, no rash. [] 


Back: No tenderness, no CVA tenderness. [] Negative CVA tenderness bilaterally


Extremities: No tenderness, no cyanosis, no clubbing, ROM intact, no edema. [] 


Neurologic: Alert and oriented X 3, normal motor function, normal sensory 

function, no focal deficits noted. []


Psychologic: Affect normal, judgement normal, mood normal. []


 (DAVIE MCGOVERN)





Current Patient Data


Vital Signs





                                   Vital Signs








  Date Time  Temp Pulse Resp B/P (MAP) Pulse Ox O2 Delivery O2 Flow Rate FiO2


 


8/26/21 21:18 98.5 67 16 102/73 98 Room Air  








 (DAVIE MCGOVERN)





EKG


EKG


[]


 (DAVIE MCGOVERN)





Radiology/Procedures


Radiology/Procedures


[]


 (DAVIE MCGOVERN)





Heart Score


C/O Chest Pain:  N/A


Risk Factors:


Risk Factors:  DM, Current or recent (<one month) smoker, HTN, HLP, family 

history of CAD, obesity.


Risk Scores:


Risk Factors:  DM, Current or recent (<one month) smoker, HTN, HLP, family 

history of CAD, obesity.


 (DAVIE MCGOVERN)





Course & Med Decision Making


Course & Med Decision Making


Pertinent Labs and Imaging studies reviewed. (See chart for details)





[]


 (DAVIE MCGOVERN)


Course & Med Decision Making


The patient's urinalysis is negative for blood.  Kidney stone is highly 

unlikely.  It is suggestive of UTI.  Will treat the patient with Macrobid for 5 

days.  We will give the first dose in the ED.  She is stable for discharge at 

this time.


 (ALEXANDRA CARLOS DO)


Dragon Disclaimer


Dragon Disclaimer


This electronic medical record was generated, in whole or in part, using a voice

 recognition dictation system.


 (DAVIE MCGOVERN)


Attending Co-Sign


The patient was seen and interviewed as well as examined at the bedside. The 

chart was reviewed. The case was discussed. Agree with the plan of care.


 (ALEXANDRA CARLOS DO)





Departure


Departure:


Impression:  


   Primary Impression:  


   Urinary tract infection


Disposition:  01 HOME / SELF CARE / HOMELESS


Condition:  STABLE


Referrals:  


NEREYDA ROB (PCP)


Patient Instructions:  Urinary Tract Infection, Easy-to-Read


Scripts


Nitrofurantoin Monohyd/M-Cryst (MACROBID 100 MG CAPSULE) 100 Mg Capsule


1 CAP PO BID for UTI for 5 Days, #10 CAP 0 Refills


   Prov: ALEXANDRA CARLOS DO         8/26/21











DAVIE MCGOVERN              Aug 26, 2021 21:30


ALEXANDRA CARLOS DO                 Aug 26, 2021 22:39